# Patient Record
Sex: MALE | Race: WHITE | NOT HISPANIC OR LATINO | Employment: OTHER | URBAN - METROPOLITAN AREA
[De-identification: names, ages, dates, MRNs, and addresses within clinical notes are randomized per-mention and may not be internally consistent; named-entity substitution may affect disease eponyms.]

---

## 2023-08-20 ENCOUNTER — ANESTHESIA (OUTPATIENT)
Dept: SURGERY | Facility: CLINIC | Age: 63
End: 2023-08-20
Payer: COMMERCIAL

## 2023-08-20 ENCOUNTER — ANESTHESIA EVENT (OUTPATIENT)
Dept: SURGERY | Facility: CLINIC | Age: 63
End: 2023-08-20
Payer: COMMERCIAL

## 2023-08-20 ENCOUNTER — APPOINTMENT (OUTPATIENT)
Dept: GENERAL RADIOLOGY | Facility: CLINIC | Age: 63
End: 2023-08-20
Attending: STUDENT IN AN ORGANIZED HEALTH CARE EDUCATION/TRAINING PROGRAM
Payer: COMMERCIAL

## 2023-08-20 ENCOUNTER — APPOINTMENT (OUTPATIENT)
Dept: CT IMAGING | Facility: CLINIC | Age: 63
End: 2023-08-20
Attending: EMERGENCY MEDICINE
Payer: COMMERCIAL

## 2023-08-20 ENCOUNTER — HOSPITAL ENCOUNTER (INPATIENT)
Facility: CLINIC | Age: 63
LOS: 1 days | Discharge: HOME | End: 2023-08-21
Attending: EMERGENCY MEDICINE | Admitting: INTERNAL MEDICINE
Payer: COMMERCIAL

## 2023-08-20 DIAGNOSIS — N20.1 URETERAL STONE: ICD-10-CM

## 2023-08-20 DIAGNOSIS — N39.0 URINARY TRACT INFECTION WITHOUT HEMATURIA, SITE UNSPECIFIED: ICD-10-CM

## 2023-08-20 LAB
ALBUMIN SERPL BCG-MCNC: 4.9 G/DL (ref 3.5–5.2)
ALBUMIN UR-MCNC: NEGATIVE MG/DL
ALP SERPL-CCNC: 89 U/L (ref 40–129)
ALT SERPL W P-5'-P-CCNC: 24 U/L (ref 0–70)
ANION GAP SERPL CALCULATED.3IONS-SCNC: 17 MMOL/L (ref 7–15)
APPEARANCE UR: CLEAR
AST SERPL W P-5'-P-CCNC: 31 U/L (ref 0–45)
BACTERIA #/AREA URNS HPF: ABNORMAL /HPF
BASOPHILS # BLD AUTO: 0.1 10E3/UL (ref 0–0.2)
BASOPHILS NFR BLD AUTO: 0 %
BILIRUB SERPL-MCNC: 0.8 MG/DL
BILIRUB UR QL STRIP: NEGATIVE
BUN SERPL-MCNC: 20.7 MG/DL (ref 8–23)
CALCIUM SERPL-MCNC: 11.1 MG/DL (ref 8.8–10.2)
CHLORIDE SERPL-SCNC: 97 MMOL/L (ref 98–107)
COLOR UR AUTO: ABNORMAL
CREAT SERPL-MCNC: 1.2 MG/DL (ref 0.67–1.17)
DEPRECATED HCO3 PLAS-SCNC: 21 MMOL/L (ref 22–29)
EOSINOPHIL # BLD AUTO: 0 10E3/UL (ref 0–0.7)
EOSINOPHIL NFR BLD AUTO: 0 %
ERYTHROCYTE [DISTWIDTH] IN BLOOD BY AUTOMATED COUNT: 13.2 % (ref 10–15)
GFR SERPL CREATININE-BSD FRML MDRD: 68 ML/MIN/1.73M2
GLUCOSE SERPL-MCNC: 122 MG/DL (ref 70–99)
GLUCOSE UR STRIP-MCNC: NEGATIVE MG/DL
HCT VFR BLD AUTO: 47.8 % (ref 40–53)
HGB BLD-MCNC: 16.5 G/DL (ref 13.3–17.7)
HGB UR QL STRIP: ABNORMAL
HOLD SPECIMEN: NORMAL
IMM GRANULOCYTES # BLD: 0.1 10E3/UL
IMM GRANULOCYTES NFR BLD: 1 %
KETONES UR STRIP-MCNC: 10 MG/DL
LACTATE SERPL-SCNC: 1.5 MMOL/L (ref 0.7–2)
LEUKOCYTE ESTERASE UR QL STRIP: ABNORMAL
LIPASE SERPL-CCNC: 33 U/L (ref 13–60)
LYMPHOCYTES # BLD AUTO: 1.4 10E3/UL (ref 0.8–5.3)
LYMPHOCYTES NFR BLD AUTO: 8 %
MAGNESIUM SERPL-MCNC: 2 MG/DL (ref 1.7–2.3)
MCH RBC QN AUTO: 31.3 PG (ref 26.5–33)
MCHC RBC AUTO-ENTMCNC: 34.5 G/DL (ref 31.5–36.5)
MCV RBC AUTO: 91 FL (ref 78–100)
MONOCYTES # BLD AUTO: 1.5 10E3/UL (ref 0–1.3)
MONOCYTES NFR BLD AUTO: 9 %
MUCOUS THREADS #/AREA URNS LPF: PRESENT /LPF
NEUTROPHILS # BLD AUTO: 13.7 10E3/UL (ref 1.6–8.3)
NEUTROPHILS NFR BLD AUTO: 82 %
NITRATE UR QL: POSITIVE
NRBC # BLD AUTO: 0 10E3/UL
NRBC BLD AUTO-RTO: 0 /100
PH UR STRIP: 5 [PH] (ref 5–7)
PLATELET # BLD AUTO: 327 10E3/UL (ref 150–450)
POTASSIUM SERPL-SCNC: 3.6 MMOL/L (ref 3.4–5.3)
PROT SERPL-MCNC: 8.2 G/DL (ref 6.4–8.3)
RBC # BLD AUTO: 5.28 10E6/UL (ref 4.4–5.9)
RBC URINE: 3 /HPF
SODIUM SERPL-SCNC: 135 MMOL/L (ref 136–145)
SP GR UR STRIP: >1.035 (ref 1–1.03)
SQUAMOUS EPITHELIAL: <1 /HPF
UROBILINOGEN UR STRIP-MCNC: NORMAL MG/DL
WBC # BLD AUTO: 16.7 10E3/UL (ref 4–11)
WBC URINE: 15 /HPF

## 2023-08-20 PROCEDURE — 258N000001 HC RX 258: Performed by: STUDENT IN AN ORGANIZED HEALTH CARE EDUCATION/TRAINING PROGRAM

## 2023-08-20 PROCEDURE — 250N000011 HC RX IP 250 OP 636: Performed by: STUDENT IN AN ORGANIZED HEALTH CARE EDUCATION/TRAINING PROGRAM

## 2023-08-20 PROCEDURE — 250N000009 HC RX 250: Performed by: NURSE ANESTHETIST, CERTIFIED REGISTERED

## 2023-08-20 PROCEDURE — 96361 HYDRATE IV INFUSION ADD-ON: CPT

## 2023-08-20 PROCEDURE — 99285 EMERGENCY DEPT VISIT HI MDM: CPT | Mod: 25

## 2023-08-20 PROCEDURE — C2617 STENT, NON-COR, TEM W/O DEL: HCPCS | Performed by: STUDENT IN AN ORGANIZED HEALTH CARE EDUCATION/TRAINING PROGRAM

## 2023-08-20 PROCEDURE — 710N000009 HC RECOVERY PHASE 1, LEVEL 1, PER MIN: Performed by: STUDENT IN AN ORGANIZED HEALTH CARE EDUCATION/TRAINING PROGRAM

## 2023-08-20 PROCEDURE — 36415 COLL VENOUS BLD VENIPUNCTURE: CPT | Performed by: EMERGENCY MEDICINE

## 2023-08-20 PROCEDURE — 250N000013 HC RX MED GY IP 250 OP 250 PS 637: Performed by: STUDENT IN AN ORGANIZED HEALTH CARE EDUCATION/TRAINING PROGRAM

## 2023-08-20 PROCEDURE — 0T778DZ DILATION OF LEFT URETER WITH INTRALUMINAL DEVICE, VIA NATURAL OR ARTIFICIAL OPENING ENDOSCOPIC: ICD-10-PCS | Performed by: STUDENT IN AN ORGANIZED HEALTH CARE EDUCATION/TRAINING PROGRAM

## 2023-08-20 PROCEDURE — 258N000003 HC RX IP 258 OP 636: Performed by: STUDENT IN AN ORGANIZED HEALTH CARE EDUCATION/TRAINING PROGRAM

## 2023-08-20 PROCEDURE — 250N000011 HC RX IP 250 OP 636: Performed by: EMERGENCY MEDICINE

## 2023-08-20 PROCEDURE — 83690 ASSAY OF LIPASE: CPT | Performed by: EMERGENCY MEDICINE

## 2023-08-20 PROCEDURE — 80053 COMPREHEN METABOLIC PANEL: CPT | Performed by: EMERGENCY MEDICINE

## 2023-08-20 PROCEDURE — 74420 UROGRAPHY RTRGR +-KUB: CPT | Mod: 26 | Performed by: STUDENT IN AN ORGANIZED HEALTH CARE EDUCATION/TRAINING PROGRAM

## 2023-08-20 PROCEDURE — 360N000083 HC SURGERY LEVEL 3 W/ FLUORO, PER MIN: Performed by: STUDENT IN AN ORGANIZED HEALTH CARE EDUCATION/TRAINING PROGRAM

## 2023-08-20 PROCEDURE — 81001 URINALYSIS AUTO W/SCOPE: CPT | Performed by: EMERGENCY MEDICINE

## 2023-08-20 PROCEDURE — 82365 CALCULUS SPECTROSCOPY: CPT | Performed by: STUDENT IN AN ORGANIZED HEALTH CARE EDUCATION/TRAINING PROGRAM

## 2023-08-20 PROCEDURE — 74177 CT ABD & PELVIS W/CONTRAST: CPT

## 2023-08-20 PROCEDURE — 250N000009 HC RX 250: Performed by: STUDENT IN AN ORGANIZED HEALTH CARE EDUCATION/TRAINING PROGRAM

## 2023-08-20 PROCEDURE — 250N000011 HC RX IP 250 OP 636: Mod: JZ | Performed by: STUDENT IN AN ORGANIZED HEALTH CARE EDUCATION/TRAINING PROGRAM

## 2023-08-20 PROCEDURE — 96375 TX/PRO/DX INJ NEW DRUG ADDON: CPT

## 2023-08-20 PROCEDURE — 258N000003 HC RX IP 258 OP 636: Performed by: NURSE ANESTHETIST, CERTIFIED REGISTERED

## 2023-08-20 PROCEDURE — 272N000001 HC OR GENERAL SUPPLY STERILE: Performed by: STUDENT IN AN ORGANIZED HEALTH CARE EDUCATION/TRAINING PROGRAM

## 2023-08-20 PROCEDURE — 96376 TX/PRO/DX INJ SAME DRUG ADON: CPT

## 2023-08-20 PROCEDURE — 99221 1ST HOSP IP/OBS SF/LOW 40: CPT | Mod: 25 | Performed by: STUDENT IN AN ORGANIZED HEALTH CARE EDUCATION/TRAINING PROGRAM

## 2023-08-20 PROCEDURE — 258N000003 HC RX IP 258 OP 636: Performed by: PHYSICIAN ASSISTANT

## 2023-08-20 PROCEDURE — 999N000141 HC STATISTIC PRE-PROCEDURE NURSING ASSESSMENT: Performed by: STUDENT IN AN ORGANIZED HEALTH CARE EDUCATION/TRAINING PROGRAM

## 2023-08-20 PROCEDURE — 258N000003 HC RX IP 258 OP 636: Performed by: EMERGENCY MEDICINE

## 2023-08-20 PROCEDURE — 0TCB8ZZ EXTIRPATION OF MATTER FROM BLADDER, VIA NATURAL OR ARTIFICIAL OPENING ENDOSCOPIC: ICD-10-PCS | Performed by: STUDENT IN AN ORGANIZED HEALTH CARE EDUCATION/TRAINING PROGRAM

## 2023-08-20 PROCEDURE — 370N000017 HC ANESTHESIA TECHNICAL FEE, PER MIN: Performed by: STUDENT IN AN ORGANIZED HEALTH CARE EDUCATION/TRAINING PROGRAM

## 2023-08-20 PROCEDURE — 87149 DNA/RNA DIRECT PROBE: CPT | Performed by: EMERGENCY MEDICINE

## 2023-08-20 PROCEDURE — 999N000179 XR SURGERY CARM FLUORO LESS THAN 5 MIN W STILLS: Mod: TC

## 2023-08-20 PROCEDURE — 250N000011 HC RX IP 250 OP 636: Performed by: NURSE ANESTHETIST, CERTIFIED REGISTERED

## 2023-08-20 PROCEDURE — 250N000025 HC SEVOFLURANE, PER MIN: Performed by: STUDENT IN AN ORGANIZED HEALTH CARE EDUCATION/TRAINING PROGRAM

## 2023-08-20 PROCEDURE — 83735 ASSAY OF MAGNESIUM: CPT | Performed by: PHYSICIAN ASSISTANT

## 2023-08-20 PROCEDURE — 85025 COMPLETE CBC W/AUTO DIFF WBC: CPT | Performed by: EMERGENCY MEDICINE

## 2023-08-20 PROCEDURE — 96374 THER/PROPH/DIAG INJ IV PUSH: CPT | Mod: 59

## 2023-08-20 PROCEDURE — 52332 CYSTOSCOPY AND TREATMENT: CPT | Mod: LT | Performed by: STUDENT IN AN ORGANIZED HEALTH CARE EDUCATION/TRAINING PROGRAM

## 2023-08-20 PROCEDURE — 87077 CULTURE AEROBIC IDENTIFY: CPT | Performed by: EMERGENCY MEDICINE

## 2023-08-20 PROCEDURE — 87086 URINE CULTURE/COLONY COUNT: CPT | Performed by: EMERGENCY MEDICINE

## 2023-08-20 PROCEDURE — 250N000011 HC RX IP 250 OP 636: Mod: JZ | Performed by: EMERGENCY MEDICINE

## 2023-08-20 PROCEDURE — 99222 1ST HOSP IP/OBS MODERATE 55: CPT | Performed by: PHYSICIAN ASSISTANT

## 2023-08-20 PROCEDURE — 120N000001 HC R&B MED SURG/OB

## 2023-08-20 PROCEDURE — 258N000003 HC RX IP 258 OP 636: Performed by: ANESTHESIOLOGY

## 2023-08-20 PROCEDURE — 83605 ASSAY OF LACTIC ACID: CPT | Performed by: EMERGENCY MEDICINE

## 2023-08-20 PROCEDURE — C1758 CATHETER, URETERAL: HCPCS | Performed by: STUDENT IN AN ORGANIZED HEALTH CARE EDUCATION/TRAINING PROGRAM

## 2023-08-20 DEVICE — STENT URETERAL POLARIS ULTRA 6FRX26CM M0061921330: Type: IMPLANTABLE DEVICE | Site: URETER | Status: FUNCTIONAL

## 2023-08-20 RX ORDER — ATORVASTATIN CALCIUM 10 MG/1
10 TABLET, FILM COATED ORAL EVERY EVENING
Status: DISCONTINUED | OUTPATIENT
Start: 2023-08-20 | End: 2023-08-21 | Stop reason: HOSPADM

## 2023-08-20 RX ORDER — ONDANSETRON 2 MG/ML
4 INJECTION INTRAMUSCULAR; INTRAVENOUS EVERY 30 MIN PRN
Status: DISCONTINUED | OUTPATIENT
Start: 2023-08-20 | End: 2023-08-20 | Stop reason: HOSPADM

## 2023-08-20 RX ORDER — CEFTRIAXONE 2 G/1
2 INJECTION, POWDER, FOR SOLUTION INTRAMUSCULAR; INTRAVENOUS ONCE
Status: COMPLETED | OUTPATIENT
Start: 2023-08-20 | End: 2023-08-20

## 2023-08-20 RX ORDER — HYDROMORPHONE HCL IN WATER/PF 6 MG/30 ML
0.2 PATIENT CONTROLLED ANALGESIA SYRINGE INTRAVENOUS
Status: DISCONTINUED | OUTPATIENT
Start: 2023-08-20 | End: 2023-08-21 | Stop reason: HOSPADM

## 2023-08-20 RX ORDER — LORATADINE 10 MG/1
10 TABLET ORAL EVERY EVENING
COMMUNITY

## 2023-08-20 RX ORDER — NALOXONE HYDROCHLORIDE 0.4 MG/ML
0.4 INJECTION, SOLUTION INTRAMUSCULAR; INTRAVENOUS; SUBCUTANEOUS
Status: DISCONTINUED | OUTPATIENT
Start: 2023-08-20 | End: 2023-08-21 | Stop reason: HOSPADM

## 2023-08-20 RX ORDER — VIBEGRON 75 MG/1
1 TABLET, FILM COATED ORAL EVERY EVENING
COMMUNITY
Start: 2023-07-24

## 2023-08-20 RX ORDER — FENTANYL CITRATE 50 UG/ML
INJECTION, SOLUTION INTRAMUSCULAR; INTRAVENOUS PRN
Status: DISCONTINUED | OUTPATIENT
Start: 2023-08-20 | End: 2023-08-20

## 2023-08-20 RX ORDER — OXYCODONE HYDROCHLORIDE 5 MG/1
10 TABLET ORAL
Status: DISCONTINUED | OUTPATIENT
Start: 2023-08-20 | End: 2023-08-20 | Stop reason: HOSPADM

## 2023-08-20 RX ORDER — ONDANSETRON 4 MG/1
4 TABLET, ORALLY DISINTEGRATING ORAL EVERY 30 MIN PRN
Status: DISCONTINUED | OUTPATIENT
Start: 2023-08-20 | End: 2023-08-20 | Stop reason: HOSPADM

## 2023-08-20 RX ORDER — ONDANSETRON 2 MG/ML
INJECTION INTRAMUSCULAR; INTRAVENOUS PRN
Status: DISCONTINUED | OUTPATIENT
Start: 2023-08-20 | End: 2023-08-20

## 2023-08-20 RX ORDER — ONDANSETRON 2 MG/ML
4 INJECTION INTRAMUSCULAR; INTRAVENOUS ONCE
Status: COMPLETED | OUTPATIENT
Start: 2023-08-20 | End: 2023-08-20

## 2023-08-20 RX ORDER — HYDROMORPHONE HCL IN WATER/PF 6 MG/30 ML
0.4 PATIENT CONTROLLED ANALGESIA SYRINGE INTRAVENOUS EVERY 5 MIN PRN
Status: DISCONTINUED | OUTPATIENT
Start: 2023-08-20 | End: 2023-08-20 | Stop reason: HOSPADM

## 2023-08-20 RX ORDER — HYDROMORPHONE HYDROCHLORIDE 1 MG/ML
0.5 INJECTION, SOLUTION INTRAMUSCULAR; INTRAVENOUS; SUBCUTANEOUS ONCE
Status: COMPLETED | OUTPATIENT
Start: 2023-08-20 | End: 2023-08-20

## 2023-08-20 RX ORDER — CEFTRIAXONE 1 G/1
1 INJECTION, POWDER, FOR SOLUTION INTRAMUSCULAR; INTRAVENOUS EVERY 24 HOURS
Status: DISCONTINUED | OUTPATIENT
Start: 2023-08-21 | End: 2023-08-21

## 2023-08-20 RX ORDER — NALOXONE HYDROCHLORIDE 0.4 MG/ML
0.2 INJECTION, SOLUTION INTRAMUSCULAR; INTRAVENOUS; SUBCUTANEOUS
Status: DISCONTINUED | OUTPATIENT
Start: 2023-08-20 | End: 2023-08-21 | Stop reason: HOSPADM

## 2023-08-20 RX ORDER — CEFTRIAXONE 2 G/1
2 INJECTION, POWDER, FOR SOLUTION INTRAMUSCULAR; INTRAVENOUS
Status: COMPLETED | OUTPATIENT
Start: 2023-08-20 | End: 2023-08-20

## 2023-08-20 RX ORDER — LIDOCAINE 40 MG/G
CREAM TOPICAL
Status: DISCONTINUED | OUTPATIENT
Start: 2023-08-20 | End: 2023-08-21 | Stop reason: HOSPADM

## 2023-08-20 RX ORDER — OXYCODONE HYDROCHLORIDE 5 MG/1
5 TABLET ORAL
Status: DISCONTINUED | OUTPATIENT
Start: 2023-08-20 | End: 2023-08-20 | Stop reason: HOSPADM

## 2023-08-20 RX ORDER — KETOROLAC TROMETHAMINE 30 MG/ML
INJECTION, SOLUTION INTRAMUSCULAR; INTRAVENOUS PRN
Status: DISCONTINUED | OUTPATIENT
Start: 2023-08-20 | End: 2023-08-20

## 2023-08-20 RX ORDER — AMOXICILLIN 250 MG
1 CAPSULE ORAL 2 TIMES DAILY PRN
Status: DISCONTINUED | OUTPATIENT
Start: 2023-08-20 | End: 2023-08-21 | Stop reason: HOSPADM

## 2023-08-20 RX ORDER — ONDANSETRON 4 MG/1
4 TABLET, ORALLY DISINTEGRATING ORAL EVERY 6 HOURS PRN
Status: DISCONTINUED | OUTPATIENT
Start: 2023-08-20 | End: 2023-08-21 | Stop reason: HOSPADM

## 2023-08-20 RX ORDER — SODIUM CHLORIDE, SODIUM LACTATE, POTASSIUM CHLORIDE, CALCIUM CHLORIDE 600; 310; 30; 20 MG/100ML; MG/100ML; MG/100ML; MG/100ML
INJECTION, SOLUTION INTRAVENOUS CONTINUOUS
Status: DISCONTINUED | OUTPATIENT
Start: 2023-08-20 | End: 2023-08-20 | Stop reason: HOSPADM

## 2023-08-20 RX ORDER — HYDROMORPHONE HCL IN WATER/PF 6 MG/30 ML
0.2 PATIENT CONTROLLED ANALGESIA SYRINGE INTRAVENOUS EVERY 5 MIN PRN
Status: DISCONTINUED | OUTPATIENT
Start: 2023-08-20 | End: 2023-08-20 | Stop reason: HOSPADM

## 2023-08-20 RX ORDER — ALLOPURINOL 100 MG/1
100 TABLET ORAL EVERY EVENING
Status: DISCONTINUED | OUTPATIENT
Start: 2023-08-20 | End: 2023-08-21 | Stop reason: HOSPADM

## 2023-08-20 RX ORDER — IOPAMIDOL 755 MG/ML
500 INJECTION, SOLUTION INTRAVASCULAR ONCE
Status: COMPLETED | OUTPATIENT
Start: 2023-08-20 | End: 2023-08-20

## 2023-08-20 RX ORDER — FENTANYL CITRATE 50 UG/ML
50 INJECTION, SOLUTION INTRAMUSCULAR; INTRAVENOUS EVERY 5 MIN PRN
Status: DISCONTINUED | OUTPATIENT
Start: 2023-08-20 | End: 2023-08-20 | Stop reason: HOSPADM

## 2023-08-20 RX ORDER — ALLOPURINOL 100 MG/1
100 TABLET ORAL EVERY EVENING
COMMUNITY
Start: 2023-08-02

## 2023-08-20 RX ORDER — ATORVASTATIN CALCIUM 10 MG/1
10 TABLET, FILM COATED ORAL EVERY EVENING
COMMUNITY

## 2023-08-20 RX ORDER — AMOXICILLIN 250 MG
2 CAPSULE ORAL 2 TIMES DAILY PRN
Status: DISCONTINUED | OUTPATIENT
Start: 2023-08-20 | End: 2023-08-21 | Stop reason: HOSPADM

## 2023-08-20 RX ORDER — HYDROMORPHONE HCL IN WATER/PF 6 MG/30 ML
0.4 PATIENT CONTROLLED ANALGESIA SYRINGE INTRAVENOUS
Status: DISCONTINUED | OUTPATIENT
Start: 2023-08-20 | End: 2023-08-21 | Stop reason: HOSPADM

## 2023-08-20 RX ORDER — LIDOCAINE HYDROCHLORIDE 20 MG/ML
INJECTION, SOLUTION INFILTRATION; PERINEURAL PRN
Status: DISCONTINUED | OUTPATIENT
Start: 2023-08-20 | End: 2023-08-20

## 2023-08-20 RX ORDER — ONDANSETRON 2 MG/ML
4 INJECTION INTRAMUSCULAR; INTRAVENOUS EVERY 6 HOURS PRN
Status: DISCONTINUED | OUTPATIENT
Start: 2023-08-20 | End: 2023-08-21 | Stop reason: HOSPADM

## 2023-08-20 RX ORDER — ACETAMINOPHEN 325 MG/1
650 TABLET ORAL EVERY 6 HOURS PRN
Status: DISCONTINUED | OUTPATIENT
Start: 2023-08-20 | End: 2023-08-21 | Stop reason: HOSPADM

## 2023-08-20 RX ORDER — DEXAMETHASONE SODIUM PHOSPHATE 4 MG/ML
INJECTION, SOLUTION INTRA-ARTICULAR; INTRALESIONAL; INTRAMUSCULAR; INTRAVENOUS; SOFT TISSUE PRN
Status: DISCONTINUED | OUTPATIENT
Start: 2023-08-20 | End: 2023-08-20

## 2023-08-20 RX ORDER — FENTANYL CITRATE 50 UG/ML
25 INJECTION, SOLUTION INTRAMUSCULAR; INTRAVENOUS EVERY 5 MIN PRN
Status: DISCONTINUED | OUTPATIENT
Start: 2023-08-20 | End: 2023-08-20 | Stop reason: HOSPADM

## 2023-08-20 RX ORDER — SODIUM CHLORIDE 9 MG/ML
INJECTION, SOLUTION INTRAVENOUS CONTINUOUS
Status: DISCONTINUED | OUTPATIENT
Start: 2023-08-20 | End: 2023-08-21 | Stop reason: HOSPADM

## 2023-08-20 RX ORDER — LISINOPRIL 10 MG/1
10 TABLET ORAL EVERY EVENING
COMMUNITY

## 2023-08-20 RX ORDER — OXYCODONE HYDROCHLORIDE 5 MG/1
5 TABLET ORAL EVERY 4 HOURS PRN
Status: DISCONTINUED | OUTPATIENT
Start: 2023-08-20 | End: 2023-08-21 | Stop reason: HOSPADM

## 2023-08-20 RX ORDER — SODIUM CHLORIDE, SODIUM LACTATE, POTASSIUM CHLORIDE, CALCIUM CHLORIDE 600; 310; 30; 20 MG/100ML; MG/100ML; MG/100ML; MG/100ML
INJECTION, SOLUTION INTRAVENOUS CONTINUOUS PRN
Status: DISCONTINUED | OUTPATIENT
Start: 2023-08-20 | End: 2023-08-20

## 2023-08-20 RX ORDER — LIDOCAINE 40 MG/G
CREAM TOPICAL
Status: DISCONTINUED | OUTPATIENT
Start: 2023-08-20 | End: 2023-08-20 | Stop reason: HOSPADM

## 2023-08-20 RX ORDER — EPHEDRINE SULFATE 50 MG/ML
INJECTION, SOLUTION INTRAMUSCULAR; INTRAVENOUS; SUBCUTANEOUS PRN
Status: DISCONTINUED | OUTPATIENT
Start: 2023-08-20 | End: 2023-08-20

## 2023-08-20 RX ORDER — FAMOTIDINE 20 MG/1
20 TABLET, FILM COATED ORAL 2 TIMES DAILY
Status: DISCONTINUED | OUTPATIENT
Start: 2023-08-20 | End: 2023-08-21 | Stop reason: HOSPADM

## 2023-08-20 RX ORDER — PROPOFOL 10 MG/ML
INJECTION, EMULSION INTRAVENOUS PRN
Status: DISCONTINUED | OUTPATIENT
Start: 2023-08-20 | End: 2023-08-20

## 2023-08-20 RX ADMIN — MIDAZOLAM 1 MG: 1 INJECTION INTRAMUSCULAR; INTRAVENOUS at 11:00

## 2023-08-20 RX ADMIN — Medication 5 MG: at 11:09

## 2023-08-20 RX ADMIN — SODIUM CHLORIDE, POTASSIUM CHLORIDE, SODIUM LACTATE AND CALCIUM CHLORIDE: 600; 310; 30; 20 INJECTION, SOLUTION INTRAVENOUS at 10:19

## 2023-08-20 RX ADMIN — ONDANSETRON 4 MG: 2 INJECTION INTRAMUSCULAR; INTRAVENOUS at 06:42

## 2023-08-20 RX ADMIN — CEFTRIAXONE 2 G: 2 INJECTION, POWDER, FOR SOLUTION INTRAMUSCULAR; INTRAVENOUS at 08:36

## 2023-08-20 RX ADMIN — ONDANSETRON 4 MG: 2 INJECTION INTRAMUSCULAR; INTRAVENOUS at 11:12

## 2023-08-20 RX ADMIN — DEXAMETHASONE SODIUM PHOSPHATE 8 MG: 4 INJECTION, SOLUTION INTRA-ARTICULAR; INTRALESIONAL; INTRAMUSCULAR; INTRAVENOUS; SOFT TISSUE at 11:05

## 2023-08-20 RX ADMIN — KETOROLAC TROMETHAMINE 15 MG: 30 INJECTION, SOLUTION INTRAMUSCULAR at 11:22

## 2023-08-20 RX ADMIN — FENTANYL CITRATE 100 MCG: 50 INJECTION, SOLUTION INTRAMUSCULAR; INTRAVENOUS at 11:05

## 2023-08-20 RX ADMIN — PHENYLEPHRINE HYDROCHLORIDE 50 MCG: 10 INJECTION INTRAVENOUS at 11:16

## 2023-08-20 RX ADMIN — SODIUM CHLORIDE, POTASSIUM CHLORIDE, SODIUM LACTATE AND CALCIUM CHLORIDE: 600; 310; 30; 20 INJECTION, SOLUTION INTRAVENOUS at 10:52

## 2023-08-20 RX ADMIN — SODIUM CHLORIDE 1000 ML: 9 INJECTION, SOLUTION INTRAVENOUS at 06:42

## 2023-08-20 RX ADMIN — CEFTRIAXONE 2 G: 2 INJECTION, POWDER, FOR SOLUTION INTRAMUSCULAR; INTRAVENOUS at 11:02

## 2023-08-20 RX ADMIN — LIDOCAINE HYDROCHLORIDE 50 MG: 20 INJECTION, SOLUTION INFILTRATION; PERINEURAL at 11:05

## 2023-08-20 RX ADMIN — SODIUM CHLORIDE: 9 INJECTION, SOLUTION INTRAVENOUS at 09:18

## 2023-08-20 RX ADMIN — ALLOPURINOL 100 MG: 100 TABLET ORAL at 22:06

## 2023-08-20 RX ADMIN — ATORVASTATIN CALCIUM 10 MG: 10 TABLET, FILM COATED ORAL at 22:06

## 2023-08-20 RX ADMIN — PROPOFOL 200 MG: 10 INJECTION, EMULSION INTRAVENOUS at 11:05

## 2023-08-20 RX ADMIN — HYDROMORPHONE HYDROCHLORIDE 0.5 MG: 1 INJECTION, SOLUTION INTRAMUSCULAR; INTRAVENOUS; SUBCUTANEOUS at 07:52

## 2023-08-20 RX ADMIN — SODIUM CHLORIDE: 9 INJECTION, SOLUTION INTRAVENOUS at 22:03

## 2023-08-20 RX ADMIN — HYDROMORPHONE HYDROCHLORIDE 0.5 MG: 1 INJECTION, SOLUTION INTRAMUSCULAR; INTRAVENOUS; SUBCUTANEOUS at 06:41

## 2023-08-20 RX ADMIN — IOPAMIDOL 98 ML: 755 INJECTION, SOLUTION INTRAVENOUS at 06:58

## 2023-08-20 RX ADMIN — FAMOTIDINE 20 MG: 20 TABLET, FILM COATED ORAL at 22:06

## 2023-08-20 ASSESSMENT — ACTIVITIES OF DAILY LIVING (ADL)
ADLS_ACUITY_SCORE: 35

## 2023-08-20 ASSESSMENT — ENCOUNTER SYMPTOMS: DYSRHYTHMIAS: 0

## 2023-08-20 NOTE — ED PROVIDER NOTES
"  History     Chief Complaint:  Abdominal Pain     HPI   Kwan Saeed is a 62 year old male with no pertinent past medical history who presents with abdominal pain beginning 6.5 hours ago. Patient states he came up here from [place] with friends for a party two days ago and reports he \"drank too much\" on Friday night. He woke up the following morning with diarrhea and took imodium which helped him feel better. Last night the patient went to another party and states he drank two beers; at midnight he reports lower abdominal cramping that has been persistent since. Endorses nausea and reports he unsuccessfully attempted to force himself to vomit. Denies allergies. He states he takes Lisinopril, Atorvastatin, Allopurinol, Gemtessa, and an antihistamine. Reports he has had kidney stones in the past. Patient still has his appendix.     Independent Historian:   None - Patient Only    Review of External Notes:   none    Medications:    Per HPI    Past Medical History:    Kidney stones   Hypertension  Reflux  Hyperlipidemia    Physical Exam   Patient Vitals for the past 24 hrs:   BP Temp Temp src Pulse Resp SpO2 Height Weight   08/20/23 0834 -- -- -- -- -- 98 % -- --   08/20/23 0819 (!) 146/87 -- -- 74 -- 96 % -- --   08/20/23 0804 -- -- -- -- -- 98 % -- --   08/20/23 0803 -- -- -- -- -- 99 % -- --   08/20/23 0802 -- -- -- -- -- 98 % -- --   08/20/23 0801 -- -- -- -- -- 96 % -- --   08/20/23 0800 (!) 152/88 -- -- 83 -- 99 % -- --   08/20/23 0724 132/75 -- -- 75 -- -- -- --   08/20/23 0635 (!) 147/87 -- -- 74 -- -- -- --   08/20/23 0633 (!) 147/87 -- -- 74 -- -- -- --   08/20/23 0616 (!) 154/89 -- -- -- -- -- -- --   08/20/23 0615 -- 97  F (36.1  C) Temporal 79 18 99 % 1.854 m (6' 1\") 88.5 kg (195 lb)        Physical Exam  Nursing note and vitals reviewed.  HENT:   Mouth/Throat: Moist mucous membranes.   Eyes: EOMI, nonicteric sclera  Cardiovascular: Normal rate, regular rhythm, no murmurs, rubs, or gallops  Pulmonary/Chest: " Effort normal and breath sounds normal. No respiratory distress. No wheezes. No rales.   Abdominal: Soft.  Tenderness to palpation right lower quadrant, nondistended, no guarding or rigidity.   Musculoskeletal: Normal range of motion.   Neurological: Alert. Moves all extremities spontaneously.   Skin: Skin is warm and dry. No rash noted.         Emergency Department Course       Imaging:  CT Abdomen Pelvis w Contrast   Final Result   IMPRESSION:    1.  Horseshoe kidney.   2.  Obstructing left 6 mm ureterovesical junction calculus resulting in moderate left moiety collecting system dilatation, perinephric edema and delayed nephrogram.   3.  Additional multiple bilateral nonobstructing renal calculi.   4.  Left lower lobe 2 mm nodule. Follow-up per Fleischner criteria.      REFERENCE:   Guidelines for Management of Incidental Pulmonary Nodules Detected on CT Images: From the Fleischner Society 2017.    Guidelines apply to incidental nodules in patients who are 35 years or older.   Guidelines do not apply to lung cancer screening, patients with immunosuppression, or patients with known primary cancer.      SINGLE NODULE   Nodule size <6 mm   Low-risk patients: No follow-up needed.   High-risk patients: Optional follow-up at 12 months.                  Report per radiology    Laboratory:  Labs Ordered and Resulted from Time of ED Arrival to Time of ED Departure   ROUTINE UA WITH MICROSCOPIC REFLEX TO CULTURE - Abnormal       Result Value    Color Urine Light Yellow      Appearance Urine Clear      Glucose Urine Negative      Bilirubin Urine Negative      Ketones Urine 10 (*)     Specific Gravity Urine >1.035 (*)     Blood Urine Trace (*)     pH Urine 5.0      Protein Albumin Urine Negative      Urobilinogen Urine Normal      Nitrite Urine Positive (*)     Leukocyte Esterase Urine Moderate (*)     Bacteria Urine Few (*)     Mucus Urine Present (*)     RBC Urine 3 (*)     WBC Urine 15 (*)     Squamous Epithelials Urine <1      COMPREHENSIVE METABOLIC PANEL - Abnormal    Sodium 135 (*)     Potassium 3.6      Chloride 97 (*)     Carbon Dioxide (CO2) 21 (*)     Anion Gap 17 (*)     Urea Nitrogen 20.7      Creatinine 1.20 (*)     Calcium 11.1 (*)     Glucose 122 (*)     Alkaline Phosphatase 89      AST 31      ALT 24      Protein Total 8.2      Albumin 4.9      Bilirubin Total 0.8      GFR Estimate 68     CBC WITH PLATELETS AND DIFFERENTIAL - Abnormal    WBC Count 16.7 (*)     RBC Count 5.28      Hemoglobin 16.5      Hematocrit 47.8      MCV 91      MCH 31.3      MCHC 34.5      RDW 13.2      Platelet Count 327      % Neutrophils 82      % Lymphocytes 8      % Monocytes 9      % Eosinophils 0      % Basophils 0      % Immature Granulocytes 1      NRBCs per 100 WBC 0      Absolute Neutrophils 13.7 (*)     Absolute Lymphocytes 1.4      Absolute Monocytes 1.5 (*)     Absolute Eosinophils 0.0      Absolute Basophils 0.1      Absolute Immature Granulocytes 0.1      Absolute NRBCs 0.0     LIPASE - Normal    Lipase 33     LACTIC ACID WHOLE BLOOD - Normal    Lactic Acid 1.5     URINE CULTURE   BLOOD CULTURE   BLOOD CULTURE        Emergency Department Course & Assessments:       Interventions:  Medications   cefTRIAXone (ROCEPHIN) 2 g vial to attach to  ml bag for ADULTS or NS 50 ml bag for PEDS (has no administration in time range)   HYDROmorphone (PF) (DILAUDID) injection 0.5 mg (0.5 mg Intravenous $Given 8/20/23 0641)   0.9% sodium chloride BOLUS (1,000 mLs Intravenous $New Bag 8/20/23 0642)   ondansetron (ZOFRAN) injection 4 mg (4 mg Intravenous $Given 8/20/23 0642)   sodium chloride (PF) 0.9% PF flush 100 mL (64 mLs Intravenous $Given 8/20/23 0657)   iopamidol (ISOVUE-370) solution 500 mL (98 mLs Intravenous $Given 8/20/23 0658)   HYDROmorphone (PF) (DILAUDID) injection 0.5 mg (0.5 mg Intravenous $Given 8/20/23 0752)        Assessments:  0631 I entered the patient's room and obtained history.  0800 I rechecked the patient and explained  findings.    Independent Interpretation (X-rays, CTs, rhythm strip):  I independently reviewed the abdominal CT.  Horseshoe kidney visualized with multiple intrarenal stones.  Evidence of obstructing stone in the distal left ureter.      Consultations/Discussion of Management or Tests:  0815    I consulted with Dr. Vazquez, urology, regarding the patient's history and presentation here in the emergency department.   0830 I consulted with Gricelda Brown PA-C, for Dr. Baumann, hospitalist, regarding the patient's history and presentation here in the emergency department who accepted the patient for admission.       Social Determinants of Health affecting care:   None    Disposition:  The patient was admitted to the hospital under the care of Dr. Baumann.     Impression & Plan      Medical Decision Making:  Patient presents with chief complaint abdominal pain.  Notably, on exam, his pain is worse in the right lower quadrant.  Broad differential considered including appendicitis, bowel obstruction, kidney stone, cholecystitis, among many other etiologies.  Patient with extensive history of kidney stones, though states this pain feels different today.  Work-up notable for leukocytosis as well as nitrite positive urine suggestive of infection.  This is in conjunction with an obstructing stone in the distal left ureter.  Discussed with on-call urology, Dr. Vazquez, who plans on operative intervention later today for likely stent placement.  Antibiotics initiated for treatment of potential septic stone.  Fortunately, no hypotension or tachycardia at this time.  The above was discussed with patient at bedside and he voices understanding, agreement, and all questions answered.  We will plan for admission bed with OR later today.  Case discussed with hospitalist service, DIAZ Brown, who accepts patient for admission.    Diagnosis:    ICD-10-CM    1. Ureteral stone  N20.1       2. Urinary tract infection without hematuria, site  unspecified  N39.0            Discharge Medications:  New Prescriptions    No medications on file     Scribe Disclosure:  I, Terencebrian Hired, am serving as a scribe at 6:26 AM on 8/20/2023 to document services personally performed by Margarito Elliott MD based on my observations and the provider's statements to me.          Margarito Elliott MD  08/20/23 7555

## 2023-08-20 NOTE — PHARMACY-ADMISSION MEDICATION HISTORY
Pharmacist Admission Medication History    Admission medication history is complete. The information provided in this note is only as accurate as the sources available at the time of the update.    Medication reconciliation/reorder completed by provider prior to medication history? No    Information Source(s): Patient and CareEverywhere/SureScripts via in-person    Pertinent Information: none    Changes made to PTA medication list:  Added: All PTA meds were added  Deleted: None  Changed: None    Medication Affordability:  Not including over the counter (OTC) medications, was there a time in the past 3 months when you did not take your medications as prescribed because of cost?: No    Allergies reviewed with patient and updates made in EHR: yes    Medication History Completed By:   Eshter Sanhcez PharmD, [facility]   Emergency Medicine Pharmacist  [phone] or Marco  August 20, 2023      Prior to Admission medications    Medication Sig Last Dose Taking? Auth Provider Long Term End Date   allopurinol (ZYLOPRIM) 100 MG tablet Take 100 mg by mouth every evening 8/19/2023 at pm Yes Unknown, Entered By History     atorvastatin (LIPITOR) 10 MG tablet Take 10 mg by mouth every evening 8/19/2023 at pm Yes Unknown, Entered By History Yes    GEMTESA 75 MG TABS tablet Take 1 tablet by mouth every evening 8/19/2023 at pm Yes Unknown, Entered By History     lisinopril (ZESTRIL) 10 MG tablet Take 10 mg by mouth every evening 8/19/2023 at pm Yes Unknown, Entered By History Yes    loratadine (CLARITIN) 10 MG tablet Take 10 mg by mouth every evening 8/19/2023 at pm Yes Unknown, Entered By History

## 2023-08-20 NOTE — ANESTHESIA CARE TRANSFER NOTE
Patient: Kwan BRITO Gross    Procedure: Procedure(s):  CYSTOSCOPY, BLADDER STONE EXTRACTION, LEFT RETROGRADE PYELOGRAM AND URETERAL STENT INSERTION       Diagnosis: Ureteral stone [N20.1]  Urinary tract infection without hematuria, site unspecified [N39.0]  Diagnosis Additional Information: No value filed.    Anesthesia Type:   General     Note:    Oropharynx: spontaneously breathing  Level of Consciousness: awake  Oxygen Supplementation: face mask    Independent Airway: airway patency satisfactory and stable  Dentition: dentition unchanged  Vital Signs Stable: post-procedure vital signs reviewed and stable  Report to RN Given: handoff report given  Patient transferred to: PACU  Comments: Awake, alert, oxygen per face mask.        Vitals:  Vitals Value Taken Time   BP     Temp 96.7  F (35.9  C) 08/20/23 1137   Pulse 78 08/20/23 1140   Resp 21 08/20/23 1140   SpO2 99 % 08/20/23 1140   Vitals shown include unvalidated device data.    Electronically Signed By: RUPALI Menchaca CRNA  August 20, 2023  11:42 AM

## 2023-08-20 NOTE — CONSULTS
Urology Consult    Name:  Kwan Saeed  MRN:  [id number]  Age/: 62 year old, 1960    CC: Bilateral kidney stones, horseshoe kidney, left distal ureteral stones with acute UTI    HPI: Kwan Saeed is a(n) 62 year old male who is a recurrent stone passer for the last several years and has had multiple procedures in the past including ureteroscopy's, percutaneous nephrolithotomy's and shockwave lithotripsy.  He has a primary urologist in [place] and follows up with him on a regular basis.  His last stones were pure uric acid and therefore he was put on prophylaxis with allopurinol but denies taking any potassium citrate.  His last procedure was a shockwave lithotripsy a couple of years ago with significant residual burden as per the patient  Today presented with new onset pain which was significant and severe.  And was seen in the ER where initial work-up revealed elevated WBC counts with a urine picture suggestive of urinary tract infection and an obstructing left distal ureteral stone and multiple kidney stones with a horseshoe kidney    Past Medical History:  History reviewed. No pertinent past medical history.    Past Surgical History:  History reviewed. No pertinent surgical history.    Allergies:   No Known Allergies    Medications:  No current facility-administered medications on file prior to encounter.  allopurinol (ZYLOPRIM) 100 MG tablet, Take 100 mg by mouth every evening  atorvastatin (LIPITOR) 10 MG tablet, Take 10 mg by mouth every evening  GEMTESA 75 MG TABS tablet, Take 1 tablet by mouth every evening  lisinopril (ZESTRIL) 10 MG tablet, Take 10 mg by mouth every evening  loratadine (CLARITIN) 10 MG tablet, Take 10 mg by mouth every evening        Social History:  Social History     Socioeconomic History    Marital status:      Spouse name: Not on file    Number of children: Not on file    Years of education: Not on file    Highest education level: Not on file   Occupational History     "Not on file   Tobacco Use    Smoking status: Never    Smokeless tobacco: Never   Substance and Sexual Activity    Alcohol use: Not on file    Drug use: Not on file    Sexual activity: Not on file   Other Topics Concern    Not on file   Social History Narrative    Not on file     Social Determinants of Health     Financial Resource Strain: Not on file   Food Insecurity: Not on file   Transportation Needs: Not on file   Physical Activity: Not on file   Stress: Not on file   Social Connections: Not on file   Intimate Partner Violence: Not on file   Housing Stability: Not on file       Family History:  History reviewed. No pertinent family history.    ROS:  The remainder of the complete ROS was negative unless noted in the HPI.    Exam:  BP (!) 154/85   Pulse 82   Temp (!) 96.6  F (35.9  C) (Temporal)   Resp 16   Ht 1.854 m (6' 1\")   Wt 88.5 kg (195 lb)   SpO2 100%   BMI 25.73 kg/m    General: Alert, interactive, & in NAD  Resp: CTAB, no crackles or wheezes  Cardiac: Regular rate; extremities warm;   Abdomen: Soft, nontender nondistended. .  : Normal   Extremities: No LE edema or obvious joint abnormalities  Skin: Warm and dry, no jaundice or rash    Labs:  Results for orders placed or performed during the hospital encounter of 08/20/23 (from the past 24 hour(s))   Extra Tube ([place] Draw)    Narrative    The following orders were created for panel order Extra Tube ([place] Draw).  Procedure                               Abnormality         Status                     ---------                               -----------         ------                     Extra Red Top Tube[[id number]]                               Final result               Extra Green Top (Lithium...[[id number]]                      Final result               Extra Purple Top Tube[[id number]]                            Final result               Extra Green Top (Lithium...[[id number]]                      Final result                 Please view " results for these tests on the individual orders.   Extra Red Top Tube   Result Value Ref Range    Hold Specimen JIC    Extra Green Top (Lithium Heparin) Tube   Result Value Ref Range    Hold Specimen JIC    Extra Purple Top Tube   Result Value Ref Range    Hold Specimen JIC    Extra Green Top (Lithium Heparin) ON ICE   Result Value Ref Range    Hold Specimen JIC    CBC + differential    Narrative    The following orders were created for panel order CBC + differential.  Procedure                               Abnormality         Status                     ---------                               -----------         ------                     CBC with platelets and d...[[id number]]  Abnormal            Final result                 Please view results for these tests on the individual orders.   Comprehensive metabolic panel   Result Value Ref Range    Sodium 135 (L) 136 - 145 mmol/L    Potassium 3.6 3.4 - 5.3 mmol/L    Chloride 97 (L) 98 - 107 mmol/L    Carbon Dioxide (CO2) 21 (L) 22 - 29 mmol/L    Anion Gap 17 (H) 7 - 15 mmol/L    Urea Nitrogen 20.7 8.0 - 23.0 mg/dL    Creatinine 1.20 (H) 0.67 - 1.17 mg/dL    Calcium 11.1 (H) 8.8 - 10.2 mg/dL    Glucose 122 (H) 70 - 99 mg/dL    Alkaline Phosphatase 89 40 - 129 U/L    AST 31 0 - 45 U/L    ALT 24 0 - 70 U/L    Protein Total 8.2 6.4 - 8.3 g/dL    Albumin 4.9 3.5 - 5.2 g/dL    Bilirubin Total 0.8 <=1.2 mg/dL    GFR Estimate 68 >60 mL/min/1.73m2   Lipase   Result Value Ref Range    Lipase 33 13 - 60 U/L   CBC with platelets and differential   Result Value Ref Range    WBC Count 16.7 (H) 4.0 - 11.0 10e3/uL    RBC Count 5.28 4.40 - 5.90 10e6/uL    Hemoglobin 16.5 13.3 - 17.7 g/dL    Hematocrit 47.8 40.0 - 53.0 %    MCV 91 78 - 100 fL    MCH 31.3 26.5 - 33.0 pg    MCHC 34.5 31.5 - 36.5 g/dL    RDW 13.2 10.0 - 15.0 %    Platelet Count 327 150 - 450 10e3/uL    % Neutrophils 82 %    % Lymphocytes 8 %    % Monocytes 9 %    % Eosinophils 0 %    % Basophils 0 %    % Immature  Granulocytes 1 %    NRBCs per 100 WBC 0 <1 /100    Absolute Neutrophils 13.7 (H) 1.6 - 8.3 10e3/uL    Absolute Lymphocytes 1.4 0.8 - 5.3 10e3/uL    Absolute Monocytes 1.5 (H) 0.0 - 1.3 10e3/uL    Absolute Eosinophils 0.0 0.0 - 0.7 10e3/uL    Absolute Basophils 0.1 0.0 - 0.2 10e3/uL    Absolute Immature Granulocytes 0.1 <=0.4 10e3/uL    Absolute NRBCs 0.0 10e3/uL   Magnesium Lab   Result Value Ref Range    Magnesium 2.0 1.7 - 2.3 mg/dL   CT Abdomen Pelvis w Contrast    Narrative    EXAM: CT ABDOMEN AND PELVIS WITH CONTRAST  LOCATION: [facility]  DATE: 08/20/2023    INDICATION: Right lower quadrant abdomen pain.  COMPARISON: None.  TECHNIQUE: CT scan of the abdomen and pelvis was performed following injection of IV contrast. Multiplanar reformats were obtained. Dose reduction techniques were used.  CONTRAST: 98 mL Isovue 370.    FINDINGS:   LOWER CHEST: Left lower lobe 2 mm nodule (2/5).    HEPATOBILIARY: A few scattered subcentimeter hypodensities are too small to characterize, likely benign. Gallbladder is unremarkable.    PANCREAS: Normal.    SPLEEN: Normal.    ADRENAL GLANDS: Normal.    KIDNEYS/BLADDER: Horseshoe kidney. Left 6 mm ureterovesical junction calculus resulting in moderate upstream collecting system dilatation of the left moiety. Additional bilateral nonobstructing calculi (at least three on the right and eight on the left   moiety). Mild left moiety perinephric edema and delayed nephrogram. No solid renal mass.    BOWEL: Unremarkable.    LYMPH NODES: Several scattered left retroperitoneal lymph nodes measuring up to 1.0 cm, likely reactive.    VASCULATURE: Unremarkable.    PELVIC ORGANS: Enlarged prostate with brachytherapy seeds.    MUSCULOSKELETAL: Unremarkable.      Impression    IMPRESSION:   1.  Horseshoe kidney.  2.  Obstructing left 6 mm ureterovesical junction calculus resulting in moderate left moiety collecting system dilatation, perinephric edema and delayed  nephrogram.  3.  Additional multiple bilateral nonobstructing renal calculi.  4.  Left lower lobe 2 mm nodule. Follow-up per Fleischner criteria.    REFERENCE:  Guidelines for Management of Incidental Pulmonary Nodules Detected on CT Images: From the Fleischner Society 2017.   Guidelines apply to incidental nodules in patients who are 35 years or older.  Guidelines do not apply to lung cancer screening, patients with immunosuppression, or patients with known primary cancer.    SINGLE NODULE  Nodule size <6 mm  Low-risk patients: No follow-up needed.  High-risk patients: Optional follow-up at 12 months.         UA with Microscopic reflex to Culture    Specimen: Urine, Midstream   Result Value Ref Range    Color Urine Light Yellow Colorless, Straw, Light Yellow, Yellow    Appearance Urine Clear Clear    Glucose Urine Negative Negative mg/dL    Bilirubin Urine Negative Negative    Ketones Urine 10 (A) Negative mg/dL    Specific Gravity Urine >1.035 (H) 1.003 - 1.035    Blood Urine Trace (A) Negative    pH Urine 5.0 5.0 - 7.0    Protein Albumin Urine Negative Negative mg/dL    Urobilinogen Urine Normal Normal, 2.0 mg/dL    Nitrite Urine Positive (A) Negative    Leukocyte Esterase Urine Moderate (A) Negative    Bacteria Urine Few (A) None Seen /HPF    Mucus Urine Present (A) None Seen /LPF    RBC Urine 3 (H) <=2 /HPF    WBC Urine 15 (H) <=5 /HPF    Squamous Epithelials Urine <1 <=1 /HPF    Narrative    Urine Culture ordered based on laboratory criteria   Lactic acid whole blood   Result Value Ref Range    Lactic Acid 1.5 0.7 - 2.0 mmol/L   XR Surgery MICHAEL L/T 5 Min Fluoro w Stills    Narrative    This exam was marked as non-reportable because it will not be read by a   radiologist or a [facility] non-radiologist provider.               Assessment and Plan: Kwan Saeed is a(n) 62 year old male with history of multiple kidney stone episodes currently presenting with obstructing left distal ureteral stone bilateral kidney  stones with features of acute urinary tract infection and elevated WBC counts.  We discussed the options of management and agreed to proceed ahead with stent placement in view of the acute obstruction, pain and the features of urinary tract infection with WBC count elevation.  I discussed with him that he may need a follow-up procedure once he travels back to [place] with his primary urologist.  We discussed about the options of managing the kidney stones which would include either ureteroscopy or percutaneous nephrolithotomy.  I discussed with him that shockwave lithotripsy may not be a great option considering the number of stones that he has.  He tells me that his stone analysis had revealed uric acid stones the last time and he was hopeful for possible medical management.  I discussed with him that we will have to look at the current stone analysis and may be consider additional therapy with potassiums citrate on top of what he is already getting with allopurinol.  We agreed and proceeded ahead to schedule him for the urine cystoscopy and stent placement.  We discussed about postoperative issues including but not limited to dysuria frequency urgency and blood in the urine related to the stent and the possibility of urinary tract infection.  An informed consent was signed      Kenneth Vazquez MD  [facility]

## 2023-08-20 NOTE — H&P
[facility]  Internal Medicine  History and Physical      Patient Name: Kwan Saeed MRN# [id number]   Age: 62 year old YOB: 1960     Date of Admission:8/20/2023    Primary care provider: No Ref-Primary, Physician  Date of Service: 8/20/2023         Assessment and Plan:   Kwan Saeed is a 62 year old male with a history of GERD, Seasonal Allergies, HTN, HLD, BPH s/p Urolift procedure, Nephrolithiasis who presents to the ED today with abdominal pain.    Obstructing left 6 mm ureterovesical junction calculus   Mild NIGEL - pt presents with abdominal pain, nausea, emesis, recent diarrhea.  Pt is hemodynamically stable, afebrile.  WBC 16.7.  Creatinine 1.2 with baseline 0.9-1.1.  Negative lactic acid.  UA abnormal c/w infection.  CT abd/pelvis revealed an obstructing left 6 mm ureterovesical junction calculus, perinephric edema with additional multiple bilateral nonobstructing renal calculi and likely reactive scattered left retroperitoneal lymph nodes.       - continue IV Ceftriaxone  - follow up urine and blood cultures  - strain urine  - IVF hydration, analgesics and antiemetics prn  - Urology consult    BPH - s/p urolift procedure 4-5 years ago.  On Gemtesa pta; hold.    HTN - hold Lisinopril due to mild NIGEL    HLD - continue Atorvastatin    GERD - uses OTC meds prn.  Start po Famotodine    Pulmonary Nodule - Left lower lobe 2 mm nodule incidentally noted on CT.  Follow up monitoring with PCP    Gout - continue Allopurinol      CODE: full  Diet/IVF: npo, NS  GI ppx:  Famotidine  DVT ppx: SCD    Gricelda Brown MS PA-C  Physician Assistant   Hospitalist Service         Chief Complaint:   Abdominal Pain         HPI:   62 year old male with a history of GERD, Seasonal Allergies, HTN, HLD, BPH s/p Urolift procedure, Nephrolithiasis who presents to the ED today with abdominal pain.    Patient reports he developed a central low abdominal pain around midnight.  Pain has been constant without  radiation.  He had an episode of nausea and attempted to make himself vomit.  He denies dysuria,  hematuria, fever.  He reports a hx of kidney stones in the past which have required intervention, most recently 1.5 years ago.  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  He is suppose to fly home to [place] today.  He denies any hx of etoh abuse.  He reports he drank more beers that normal for him on Friday night and had a few loose stools yesterday which resolved after Imodium.  He had a a few beers last night as well.          Past Medical History:    Seasonal allergies 1980    Hyperlipidemia 2005    Kidney stones 10/2005    Heartburn 2011    S/P colonoscopy 8/2012    Seborrheic dermatitis 2013    [facility]    Benign hypertension        Past Surgical History:    OTHER 10/2005   Lithotripsy    OTHER SURGICAL HISTORY 2000, 2013   Cyst removed from back/head-benign        Social History:     Social History     Socioeconomic History    Marital status: Not on file     Spouse name: Not on file    Number of children: Not on file    Years of education: Not on file    Highest education level: Not on file   Occupational History    Not on file   Tobacco Use    Smoking status: Not on file    Smokeless tobacco: Not on file   Substance and Sexual Activity    Alcohol use: Not on file    Drug use: Not on file    Sexual activity: Not on file   Other Topics Concern    Not on file   Social History Narrative    Not on file     Social Determinants of Health     Financial Resource Strain: Not on file   Food Insecurity: Not on file   Transportation Needs: Not on file   Physical Activity: Not on file   Stress: Not on file   Social Connections: Not on file   Intimate Partner Violence: Not on file   Housing Stability: Not on file          Family History:    Cancer Mother 70   Basal cells removed     "Cancer Brother 40   Thyroid cancer        Allergies:    No Known Allergies       Medications:     Prior to Admission medications    Not on File          Review of Systems:   A complete ROS was performed and is negative other than what is stated in the HPI.       Physical Exam:   Blood pressure (!) 146/87, pulse 74, temperature 97  F (36.1  C), temperature source Temporal, resp. rate 18, height 1.854 m (6' 1\"), weight 88.5 kg (195 lb), SpO2 98 %.  General: Alert, interactive, NAD, sitting up in bed, pleasant and cooperative  HEENT: AT/NC  Chest/Resp: clear to auscultation bilaterally, no crackles or wheezes  Heart/CV: regular rate and rhythm, no murmur  Abdomen/GI: Soft, mild suprapubic tenderness, nondistended. +BS.  No rebound or guarding.  Extremities/MSK: No LE edema  Skin: Warm and dry  Neuro: Alert & oriented x 3, no focal deficits, moves all extremities equally         Labs:   ROUTINE ICU LABS (Last four results)  CMP  Recent Labs   Lab 08/20/23  0636   *   POTASSIUM 3.6   CHLORIDE 97*   CO2 21*   ANIONGAP 17*   *   BUN 20.7   CR 1.20*   GFRESTIMATED 68   MARÍA 11.1*   PROTTOTAL 8.2   ALBUMIN 4.9   BILITOTAL 0.8   ALKPHOS 89   AST 31   ALT 24     CBC  Recent Labs   Lab 08/20/23  0636   WBC 16.7*   RBC 5.28   HGB 16.5   HCT 47.8   MCV 91   MCH 31.3   MCHC 34.5   RDW 13.2        INRNo lab results found in last 7 days.  Arterial Blood GasNo lab results found in last 7 days.       Imaging/Procedures:     Results for orders placed or performed during the hospital encounter of 08/20/23   CT Abdomen Pelvis w Contrast    Narrative    EXAM: CT ABDOMEN AND PELVIS WITH CONTRAST  LOCATION: [facility]  DATE: 08/20/2023    INDICATION: Right lower quadrant abdomen pain.  COMPARISON: None.  TECHNIQUE: CT scan of the abdomen and pelvis was performed following injection of IV contrast. Multiplanar reformats were obtained. Dose reduction techniques were used.  CONTRAST: 98 mL Isovue " 370.    FINDINGS:   LOWER CHEST: Left lower lobe 2 mm nodule (2/5).    HEPATOBILIARY: A few scattered subcentimeter hypodensities are too small to characterize, likely benign. Gallbladder is unremarkable.    PANCREAS: Normal.    SPLEEN: Normal.    ADRENAL GLANDS: Normal.    KIDNEYS/BLADDER: Horseshoe kidney. Left 6 mm ureterovesical junction calculus resulting in moderate upstream collecting system dilatation of the left moiety. Additional bilateral nonobstructing calculi (at least three on the right and eight on the left   moiety). Mild left moiety perinephric edema and delayed nephrogram. No solid renal mass.    BOWEL: Unremarkable.    LYMPH NODES: Several scattered left retroperitoneal lymph nodes measuring up to 1.0 cm, likely reactive.    VASCULATURE: Unremarkable.    PELVIC ORGANS: Enlarged prostate with brachytherapy seeds.    MUSCULOSKELETAL: Unremarkable.      Impression    IMPRESSION:   1.  Horseshoe kidney.  2.  Obstructing left 6 mm ureterovesical junction calculus resulting in moderate left moiety collecting system dilatation, perinephric edema and delayed nephrogram.  3.  Additional multiple bilateral nonobstructing renal calculi.  4.  Left lower lobe 2 mm nodule. Follow-up per Fleischner criteria.    REFERENCE:  Guidelines for Management of Incidental Pulmonary Nodules Detected on CT Images: From the Fleischner Society 2017.   Guidelines apply to incidental nodules in patients who are 35 years or older.  Guidelines do not apply to lung cancer screening, patients with immunosuppression, or patients with known primary cancer.    SINGLE NODULE  Nodule size <6 mm  Low-risk patients: No follow-up needed.  High-risk patients: Optional follow-up at 12 months.

## 2023-08-20 NOTE — ANESTHESIA PREPROCEDURE EVALUATION
Anesthesia Pre-Procedure Evaluation    Patient: Kwan Saeed   MRN: [id number] : 1960        Procedure : Procedure(s):  CYSTOSCOPY, WITH RETROGRADE PYELOGRAM AND URETERAL STENT INSERTION          No past medical history on file.   No past surgical history on file.   No Known Allergies   Social History     Tobacco Use     Smoking status: Not on file     Smokeless tobacco: Not on file   Substance Use Topics     Alcohol use: Not on file      Wt Readings from Last 1 Encounters:   23 88.5 kg (195 lb)        Anesthesia Evaluation            ROS/MED HX  ENT/Pulmonary:    (-) sleep apnea   Neurologic:  - neg neurologic ROS     Cardiovascular:     (+) Dyslipidemia hypertension- -   -  - -                                   (-) CAD, CHF, arrhythmias and pulmonary hypertension   METS/Exercise Tolerance:     Hematologic:  - neg hematologic  ROS     Musculoskeletal:  - neg musculoskeletal ROS     GI/Hepatic:     (+) GERD (Asymptomatic since assisted 4 years ago.),                   Renal/Genitourinary:     (+) renal disease,  Pt does not require dialysis,    Nephrolithiasis , BPH,      Endo:  - neg endo ROS     Psychiatric/Substance Use:  - neg psychiatric ROS     Infectious Disease:       Malignancy:       Other:            Physical Exam    Airway        Mallampati: II   TM distance: > 3 FB   Neck ROM: full   Mouth opening: > 3 cm    Respiratory Devices and Support         Dental           Cardiovascular   cardiovascular exam normal          Pulmonary   pulmonary exam normal            Other findings: Lab Test        23                       0636          WBC          16.7*         HGB          16.5          MCV          91            PLT          327            Lab Test        23                       0636          NA           135*          POTASSIUM    3.6           CHLORIDE     97*           CO2          21*           BUN          20.7          CR           1.20*         ANIONGAP     17*            MARÍA          11.1*         GLC          122*          OUTSIDE LABS:  CBC:   Lab Results   Component Value Date    WBC 16.7 (H) 08/20/2023    HGB 16.5 08/20/2023    HCT 47.8 08/20/2023     08/20/2023     BMP:   Lab Results   Component Value Date     (L) 08/20/2023    POTASSIUM 3.6 08/20/2023    CHLORIDE 97 (L) 08/20/2023    CO2 21 (L) 08/20/2023    BUN 20.7 08/20/2023    CR 1.20 (H) 08/20/2023     (H) 08/20/2023     COAGS: No results found for: PTT, INR, FIBR  POC: No results found for: BGM, HCG, HCGS  HEPATIC:   Lab Results   Component Value Date    ALBUMIN 4.9 08/20/2023    PROTTOTAL 8.2 08/20/2023    ALT 24 08/20/2023    AST 31 08/20/2023    ALKPHOS 89 08/20/2023    BILITOTAL 0.8 08/20/2023     OTHER:   Lab Results   Component Value Date    LACT 1.5 08/20/2023    MARÍA 11.1 (H) 08/20/2023    MAG 2.0 08/20/2023    LIPASE 33 08/20/2023       Anesthesia Plan    ASA Status:  2       Anesthesia Type: General.     - Airway: LMA   Induction: Propofol.   Maintenance: Balanced.        Consents    Anesthesia Plan(s) and associated risks, benefits, and realistic alternatives discussed. Questions answered and patient/representative(s) expressed understanding.     - Discussed: Risks, Benefits and Alternatives for the PROCEDURE were discussed     - Discussed with:  Patient            Postoperative Care    Pain management: IV analgesics, Oral pain medications.   PONV prophylaxis: Ondansetron (or other 5HT-3), Background Propofol Infusion     Comments:                Favian Roberto MD

## 2023-08-20 NOTE — ED NOTES
"[facility]  ED Nurse Handoff Report    ED Chief complaint: Abdominal Pain  . ED Diagnosis:   Final diagnoses:   None       Allergies: No Known Allergies    Code Status: Full Code    Activity level - Baseline/Home:  independent.  Activity Level - Current:   independent.   Lift room needed: No.   Bariatric: No   Needed: No   Isolation: No.   Infection: Not Applicable.     Respiratory status: Room air    Vital Signs (within 30 minutes):   Vitals:    08/20/23 0800 08/20/23 0801 08/20/23 0802 08/20/23 0803   BP: (!) 152/88      Pulse: 83      Resp:       Temp:       TempSrc:       SpO2: 99% 96% 98% 99%   Weight:       Height:           Cardiac Rhythm:  ,      Pain level:    Patient confused: No.   Patient Falls Risk: nonskid shoes/slippers when out of bed.   Elimination Status: Has voided     Patient Report - Initial Complaint: abd apin.   Focused Assessment:   Kwan Saeed is a 62 year old male with no pertinent past medical history who presents with abdominal pain beginning 6.5 hours ago. Patient states he came up here from [place] with friends for a party two days ago and reports he \"drank too much\" on Friday night. He woke up the following morning with diarrhea and took imodium which helped him feel better. Last night the patient went to another party and states he drank two beers; at midnight he reports lower abdominal cramping that has been persistent since. Endorses nausea and reports he unsuccessfully attempted to force himself to vomit. Denies allergies. He states he takes Lisinopril, Atorvastatin, Allopurinol, Gemtessa, and an antihistamine. Reports he has had kidney stones in the past. Patient still has his appendix.       Abnormal Results:   Labs Ordered and Resulted from Time of ED Arrival to Time of ED Departure   ROUTINE UA WITH MICROSCOPIC REFLEX TO CULTURE - Abnormal       Result Value    Color Urine Light Yellow      Appearance Urine Clear      Glucose Urine Negative   "    Bilirubin Urine Negative      Ketones Urine 10 (*)     Specific Gravity Urine >1.035 (*)     Blood Urine Trace (*)     pH Urine 5.0      Protein Albumin Urine Negative      Urobilinogen Urine Normal      Nitrite Urine Positive (*)     Leukocyte Esterase Urine Moderate (*)     Bacteria Urine Few (*)     Mucus Urine Present (*)     RBC Urine 3 (*)     WBC Urine 15 (*)     Squamous Epithelials Urine <1     COMPREHENSIVE METABOLIC PANEL - Abnormal    Sodium 135 (*)     Potassium 3.6      Chloride 97 (*)     Carbon Dioxide (CO2) 21 (*)     Anion Gap 17 (*)     Urea Nitrogen 20.7      Creatinine 1.20 (*)     Calcium 11.1 (*)     Glucose 122 (*)     Alkaline Phosphatase 89      AST 31      ALT 24      Protein Total 8.2      Albumin 4.9      Bilirubin Total 0.8      GFR Estimate 68     CBC WITH PLATELETS AND DIFFERENTIAL - Abnormal    WBC Count 16.7 (*)     RBC Count 5.28      Hemoglobin 16.5      Hematocrit 47.8      MCV 91      MCH 31.3      MCHC 34.5      RDW 13.2      Platelet Count 327      % Neutrophils 82      % Lymphocytes 8      % Monocytes 9      % Eosinophils 0      % Basophils 0      % Immature Granulocytes 1      NRBCs per 100 WBC 0      Absolute Neutrophils 13.7 (*)     Absolute Lymphocytes 1.4      Absolute Monocytes 1.5 (*)     Absolute Eosinophils 0.0      Absolute Basophils 0.1      Absolute Immature Granulocytes 0.1      Absolute NRBCs 0.0     LIPASE - Normal    Lipase 33     LACTIC ACID WHOLE BLOOD   URINE CULTURE   BLOOD CULTURE   BLOOD CULTURE        CT Abdomen Pelvis w Contrast   Final Result   IMPRESSION:    1.  Horseshoe kidney.   2.  Obstructing left 6 mm ureterovesical junction calculus resulting in moderate left moiety collecting system dilatation, perinephric edema and delayed nephrogram.   3.  Additional multiple bilateral nonobstructing renal calculi.   4.  Left lower lobe 2 mm nodule. Follow-up per Fleischner criteria.      REFERENCE:   Guidelines for Management of Incidental Pulmonary  Nodules Detected on CT Images: From the Fleischner Society 2017.    Guidelines apply to incidental nodules in patients who are 35 years or older.   Guidelines do not apply to lung cancer screening, patients with immunosuppression, or patients with known primary cancer.      SINGLE NODULE   Nodule size <6 mm   Low-risk patients: No follow-up needed.   High-risk patients: Optional follow-up at 12 months.                   Treatments provided see mar  Family Comments: wife here  OBS brochure/video discussed/provided to patient:  Yes  ED Medications:   Medications   cefTRIAXone (ROCEPHIN) 2 g vial to attach to  ml bag for ADULTS or NS 50 ml bag for PEDS (has no administration in time range)   HYDROmorphone (PF) (DILAUDID) injection 0.5 mg (0.5 mg Intravenous $Given 8/20/23 0641)   0.9% sodium chloride BOLUS (1,000 mLs Intravenous $New Bag 8/20/23 0642)   ondansetron (ZOFRAN) injection 4 mg (4 mg Intravenous $Given 8/20/23 0642)   sodium chloride (PF) 0.9% PF flush 100 mL (64 mLs Intravenous $Given 8/20/23 0657)   iopamidol (ISOVUE-370) solution 500 mL (98 mLs Intravenous $Given 8/20/23 0658)   HYDROmorphone (PF) (DILAUDID) injection 0.5 mg (0.5 mg Intravenous $Given 8/20/23 0752)       Drips infusing:  Yes  For the majority of the shift this patient was Green.   Interventions performed were n/a.    Sepsis treatment initiated: No    Cares/treatment/interventions/medications to be completed following ED care:     ED Nurse Name: Rosa Prather RN  8:09 AM

## 2023-08-20 NOTE — ANESTHESIA POSTPROCEDURE EVALUATION
Patient: Kwan Saeed    Procedure: Procedure(s):  CYSTOSCOPY, BLADDER STONE EXTRACTION, LEFT RETROGRADE PYELOGRAM AND URETERAL STENT INSERTION       Anesthesia Type:  General    Note:  Disposition: Outpatient   Postop Pain Control: Uneventful            Sign Out: Well controlled pain   PONV: No   Neuro/Psych: Uneventful            Sign Out: Acceptable/Baseline neuro status   Airway/Respiratory: Uneventful            Sign Out: Acceptable/Baseline resp. status   CV/Hemodynamics: Uneventful            Sign Out: Acceptable CV status; No obvious hypovolemia; No obvious fluid overload   Other NRE:    DID A NON-ROUTINE EVENT OCCUR?            Last vitals:  Vitals Value Taken Time   /72 08/20/23 1150   Temp 96.7  F (35.9  C) 08/20/23 1137   Pulse 80 08/20/23 1158   Resp 12 08/20/23 1158   SpO2 98 % 08/20/23 1158   Vitals shown include unvalidated device data.    Electronically Signed By: Favian Roberto MD  August 20, 2023  11:59 AM

## 2023-08-20 NOTE — PLAN OF CARE
Goal Outcome Evaluation:                      To Do:  End of Shift Summary  For vital signs and complete assessments, please see documentation flowsheets.     Pertinent assessments:  A&O--  arrived from PACU @ 1430----  CAPNO on --- denies pain and nausea -- voiding per urinal  see flowsheets -----tolerating lite diet   Major Shift Event  voided   Treatment Plan:monitor  Bedside Nurse: Eden Roque RN

## 2023-08-20 NOTE — OP NOTE
OPERATIVE REPORT    PREOPERATIVE DIAGNOSIS: Bilateral kidney l stone(s), left distal ureteral stone, acute UTI    POSTOPERATIVE DIAGNOSIS:  Same    PROCEDURES PERFORMED:   1. Cystourethroscopy with left retrograde pyelography  2. Placement of left ureteral stent.  3. Intraoperative interpretation of fluoroscopic imaging.  4.  Removal of bladder stone    STAFF SURGEON: Kenneth Vazquez MD, present for entire case.     ANESTHESIA: General    ESTIMATED BLOOD LOSS: 1 mL.     DRAINS: 6 Fr 26 cm Double J Stent  Specimen: Bladder stone      OPERATIVE INDICATIONS:   Kwan Saeed is a 62 year old male who presented with a left obstructing ureteral stone.  He also has a bilateral kidney stones, horseshoe kidney and an acute UTI.  The patient was counseled on the alternatives, risks, and benefits and elected to proceed with the above stated procedure.    DESCRIPTION OF PROCEDURE:    After informed consent was obtained, the patient was taken to the operating room, and moved to the operating table.  After adequate anesthesia was induced, the patient was repositioned in dorsal lithotomy position and prepped and draped in the usual sterile fashion. A timeout was taken to confirm correct patient, procedure and laterality.     A 22-Pu[place] Rican cystoscope was inserted into a well lubricated urethra. The urethra was unremarkable.  The bladder was free of tumors, stones or diverticuli.  The media was turbid.  Bilateral ureteral orifices were orthotopic.  At the time of cystoscopy, we noticed that the distal ureteral stone had just dropped into the bladder.  There was significant edema and inflammation at the left ureteral orifice.  We started first by retrieving the bladder stone.  Bladder stone was handed over for stone analysis.  Retrograde pyelogram was performed with the help of a 5 [place]ican open-ended ureteral catheter on the left which showed dilated ureter and mild to moderate hydronephrosis.  There were no filling defects.  In view  of the acute UTI with elevated WBC counts, features of distal ureteral obstruction that were persistent as well as the anticipated need for a follow-up procedure for remaining kidney stones we proceeded ahead with stent placement.  A Sensor guidewire was advanced into the left renal pelvis with the aid of a 5-Ivorian open ended ureteral catheter. The wire was replaced and a 6 Fr 26 cm Double J Stent was advanced over the guidewire under fluoroscopic guidance with a good curl in the renal pelvis and bladder.  The stent passed up easily with no appreciable resistance.  The bladder was then drained.    The patient tolerated the procedure well.  There were no complications.         PLAN:   - Admit overnight for monitoring  -Follow-up lives in [place] and has a regular urologist, he will follow-up with his regular urologist for stent removal/ureteroscopy

## 2023-08-20 NOTE — ED TRIAGE NOTES
Pt reports abd pain started midnight. Pt had diarrhea Friday and took 2 imodium Saturday. Pt c/o nausea

## 2023-08-21 VITALS
HEIGHT: 73 IN | OXYGEN SATURATION: 93 % | BODY MASS INDEX: 25.84 KG/M2 | WEIGHT: 195 LBS | TEMPERATURE: 98.9 F | HEART RATE: 94 BPM | RESPIRATION RATE: 20 BRPM | DIASTOLIC BLOOD PRESSURE: 76 MMHG | SYSTOLIC BLOOD PRESSURE: 128 MMHG

## 2023-08-21 LAB
ANION GAP SERPL CALCULATED.3IONS-SCNC: 11 MMOL/L (ref 7–15)
BUN SERPL-MCNC: 14.2 MG/DL (ref 8–23)
CALCIUM SERPL-MCNC: 9.4 MG/DL (ref 8.8–10.2)
CHLORIDE SERPL-SCNC: 106 MMOL/L (ref 98–107)
CREAT SERPL-MCNC: 0.94 MG/DL (ref 0.67–1.17)
DEPRECATED HCO3 PLAS-SCNC: 23 MMOL/L (ref 22–29)
ENTEROCOCCUS FAECALIS: NOT DETECTED
ENTEROCOCCUS FAECIUM: NOT DETECTED
ERYTHROCYTE [DISTWIDTH] IN BLOOD BY AUTOMATED COUNT: 13.4 % (ref 10–15)
GFR SERPL CREATININE-BSD FRML MDRD: >90 ML/MIN/1.73M2
GLUCOSE SERPL-MCNC: 126 MG/DL (ref 70–99)
HCT VFR BLD AUTO: 40.7 % (ref 40–53)
HGB BLD-MCNC: 14.1 G/DL (ref 13.3–17.7)
LACTATE SERPL-SCNC: 1.6 MMOL/L (ref 0.7–2)
LISTERIA SPECIES (DETECTED/NOT DETECTED): NOT DETECTED
MAGNESIUM SERPL-MCNC: 1.9 MG/DL (ref 1.7–2.3)
MCH RBC QN AUTO: 31.8 PG (ref 26.5–33)
MCHC RBC AUTO-ENTMCNC: 34.6 G/DL (ref 31.5–36.5)
MCV RBC AUTO: 92 FL (ref 78–100)
PLATELET # BLD AUTO: 289 10E3/UL (ref 150–450)
POTASSIUM SERPL-SCNC: 4.3 MMOL/L (ref 3.4–5.3)
RBC # BLD AUTO: 4.43 10E6/UL (ref 4.4–5.9)
SODIUM SERPL-SCNC: 140 MMOL/L (ref 136–145)
STAPHYLOCOCCUS AUREUS: DETECTED
STAPHYLOCOCCUS EPIDERMIDIS: NOT DETECTED
STAPHYLOCOCCUS LUGDUNENSIS: NOT DETECTED
STREPTOCOCCUS AGALACTIAE: NOT DETECTED
STREPTOCOCCUS ANGINOSUS GROUP: NOT DETECTED
STREPTOCOCCUS PNEUMONIAE: NOT DETECTED
STREPTOCOCCUS PYOGENES: NOT DETECTED
STREPTOCOCCUS SPECIES: NOT DETECTED
WBC # BLD AUTO: 18.1 10E3/UL (ref 4–11)

## 2023-08-21 PROCEDURE — 258N000003 HC RX IP 258 OP 636: Performed by: INTERNAL MEDICINE

## 2023-08-21 PROCEDURE — 99232 SBSQ HOSP IP/OBS MODERATE 35: CPT | Performed by: PHYSICIAN ASSISTANT

## 2023-08-21 PROCEDURE — 250N000013 HC RX MED GY IP 250 OP 250 PS 637: Performed by: STUDENT IN AN ORGANIZED HEALTH CARE EDUCATION/TRAINING PROGRAM

## 2023-08-21 PROCEDURE — 99238 HOSP IP/OBS DSCHRG MGMT 30/<: CPT | Performed by: INTERNAL MEDICINE

## 2023-08-21 PROCEDURE — 80048 BASIC METABOLIC PNL TOTAL CA: CPT | Performed by: STUDENT IN AN ORGANIZED HEALTH CARE EDUCATION/TRAINING PROGRAM

## 2023-08-21 PROCEDURE — 250N000011 HC RX IP 250 OP 636: Performed by: INTERNAL MEDICINE

## 2023-08-21 PROCEDURE — 85027 COMPLETE CBC AUTOMATED: CPT | Performed by: STUDENT IN AN ORGANIZED HEALTH CARE EDUCATION/TRAINING PROGRAM

## 2023-08-21 PROCEDURE — 83735 ASSAY OF MAGNESIUM: CPT | Performed by: STUDENT IN AN ORGANIZED HEALTH CARE EDUCATION/TRAINING PROGRAM

## 2023-08-21 PROCEDURE — 36415 COLL VENOUS BLD VENIPUNCTURE: CPT | Performed by: INTERNAL MEDICINE

## 2023-08-21 PROCEDURE — 36415 COLL VENOUS BLD VENIPUNCTURE: CPT | Performed by: STUDENT IN AN ORGANIZED HEALTH CARE EDUCATION/TRAINING PROGRAM

## 2023-08-21 PROCEDURE — 258N000003 HC RX IP 258 OP 636: Performed by: STUDENT IN AN ORGANIZED HEALTH CARE EDUCATION/TRAINING PROGRAM

## 2023-08-21 PROCEDURE — 83605 ASSAY OF LACTIC ACID: CPT | Performed by: INTERNAL MEDICINE

## 2023-08-21 RX ADMIN — FAMOTIDINE 20 MG: 20 TABLET, FILM COATED ORAL at 08:21

## 2023-08-21 RX ADMIN — SODIUM CHLORIDE: 9 INJECTION, SOLUTION INTRAVENOUS at 08:23

## 2023-08-21 RX ADMIN — VANCOMYCIN HYDROCHLORIDE 1500 MG: 10 INJECTION, POWDER, LYOPHILIZED, FOR SOLUTION INTRAVENOUS at 08:23

## 2023-08-21 ASSESSMENT — ACTIVITIES OF DAILY LIVING (ADL)
ADLS_ACUITY_SCORE: 35

## 2023-08-21 NOTE — PLAN OF CARE
Goal Outcome Evaluation:       Pt up ind. VSS A&O. IVF infusing. [place] diet, denies nausea. States pain with urination, voids in urinal, urine clear.

## 2023-08-21 NOTE — PHARMACY-VANCOMYCIN DOSING SERVICE
Pharmacy Vancomycin Initial Note  Date of Service 2023  Patient's  1960  62 year old, male    Indication: Bacteremia    Current estimated CrCl = Estimated Creatinine Clearance: 77.8 mL/min (A) (based on SCr of 1.2 mg/dL (H)).    Creatinine for last 3 days  2023:  6:36 AM Creatinine 1.20 mg/dL    Recent Vancomycin Level(s) for last 3 days  No results found for requested labs within last 3 days.      Vancomycin IV Administrations (past 72 hours)        No vancomycin orders with administrations in past 72 hours.                    Nephrotoxins and other renal medications (From now, onward)      Start     Dose/Rate Route Frequency Ordered Stop    23 0730  vancomycin (VANCOCIN) 1,500 mg in 0.9% NaCl 250 mL intermittent infusion         1,500 mg  over 90 Minutes Intravenous EVERY 24 HOURS 23 0701              Contrast Orders - past 72 hours (72h ago, onward)      Start     Dose/Rate Route Frequency Stop    23 1119  ioversol 68% (OPTIRAY 320) 50 mL + sterile water 50 mL injection  Status:  Discontinued           PRN 23 1136    23 0700  iopamidol (ISOVUE-370) solution 500 mL         500 mL Intravenous ONCE 23 0658            [employer] Prediction of Planned Initial Vancomycin Regimen  Loading dose: N/A  Regimen: 1500 mg IV every 24 hours.  Start time: 07:13 on 2023  Exposure target: AUC24 (range)400-600 mg/L.hr   AUC24,ss: 439 mg/L.hr  Probability of AUC24 > 400: 61 %  Ctrough,ss: 12.4 mg/L  Probability of Ctrough,ss > 20: 10 %  Probability of nephrotoxicity (Lodise DEBBIE ): 8 %          Plan:  Start vancomycin  1500 mg IV q24h.   Vancomycin monitoring method: AUC  Vancomycin therapeutic monitoring goal: 400-600 mg*h/L  Pharmacy will check vancomycin levels as appropriate in 1-3 Days.    Serum creatinine levels will be ordered daily for the first week of therapy and at least twice weekly for subsequent weeks.      Yi Emerson [facility]

## 2023-08-21 NOTE — PROGRESS NOTES
Patient hospitalized for UTI. Cleared for discharge to home with self care today per MD. Discharge instructions, medications, and follow-ups reviewed with patient in detail. Discharge medications, including Amoxicillin were filled at [facility] pharmacy . Patient  verbalized understanding of discharge instructions. Belongings were returned to patient at time of discharge. Spouse providing transport home.

## 2023-08-21 NOTE — PROGRESS NOTES
[facility] Progress Note          Assessment and Plan:     Assessment:    POD 1 Cystourethroscopy with left retrograde pyelography, placement of left ureteral stent, intraoperative interpretation of fluoroscopic imaging, removal of bladder stone    Urinary tract infection without hematuria, site unspecified    Ureteral stone    History of uric acid nephrolithiasis    Horseshoe kidney    Bacteremia      Plan:   -Continue with antibiotics.  Per discussion with medicine, it appears patient is going to head home today to [place].  He is being discharged on Augmentin.  Will need cultures follow to ensure this is adequate coverage given positive blood cultures and urine culture that is still pending.  -Continue with indwelling ureteral stent.  Discussed that he will need to be with his local urologist to have removal of this after treatment of infection.  -Possible side effects with an indwelling ureteral stent such as urgency and frequency of urination, dysuria, hematuria, symptoms of urine reflux, and some achiness in the side. Indwelling ureteral stents need to be exchanged every three months or removed by three months.    -Briefly discussed additional preventative recommendations for uric acid stones.  Patient is already on allopurinol.  Briefly discussed potassium citrate.    Veronika Muhammad PA-C   [facility]  [phone]               Interval History:     Doing okay.  Some reflux from the stent.  Denies N/V/F/C/SOB/CP.   Blood cultures 2 of 2 positive for gram-positive cocci in clusters.  Urine culture is currently in process.  Patient is on IV Rocephin and then was going to be switched to possible IV vancomycin.  WBC 10.1 (16.7).  Is currently afebrile.  Brief episodes of tachypnea and tachycardia.  Creatinine 0.94 EGFR greater than 90 (1.20 EGFR of 68).  Previous stones have been uric acid.  Patient is from [place] and is planning on trying to go home today.              Review of  Systems:     The 5 point Review of Systems is negative other than noted in the HPI             Medications:     Current Facility-Administered Medications Ordered in Epic   Medication Dose Route Frequency Last Rate Last Admin    acetaminophen (TYLENOL) tablet 650 mg  650 mg Oral Q6H PRN        allopurinol (ZYLOPRIM) tablet 100 mg  100 mg Oral QPM   100 mg at 08/20/23 2206    atorvastatin (LIPITOR) tablet 10 mg  10 mg Oral QPM   10 mg at 08/20/23 2206    famotidine (PEPCID) tablet 20 mg  20 mg Oral BID   20 mg at 08/21/23 0821    HYDROmorphone (DILAUDID) injection 0.2 mg  0.2 mg Intravenous Q2H PRN        HYDROmorphone (DILAUDID) injection 0.4 mg  0.4 mg Intravenous Q2H PRN        lidocaine (LMX4) cream   Topical Q1H PRN        lidocaine 1 % 0.1-1 mL  0.1-1 mL Other Q1H PRN        naloxone (NARCAN) injection 0.2 mg  0.2 mg Intravenous Q2 Min PRN        Or    naloxone (NARCAN) injection 0.4 mg  0.4 mg Intravenous Q2 Min PRN        Or    naloxone (NARCAN) injection 0.2 mg  0.2 mg Intramuscular Q2 Min PRN        Or    naloxone (NARCAN) injection 0.4 mg  0.4 mg Intramuscular Q2 Min PRN        ondansetron (ZOFRAN ODT) ODT tab 4 mg  4 mg Oral Q6H PRN        Or    ondansetron (ZOFRAN) injection 4 mg  4 mg Intravenous Q6H PRN        oxyCODONE (ROXICODONE) tablet 5 mg  5 mg Oral Q4H PRN        oxyCODONE IR (ROXICODONE) half-tab 2.5 mg  2.5 mg Oral Q4H PRN        senna-docusate (SENOKOT-S/PERICOLACE) 8.6-50 MG per tablet 1 tablet  1 tablet Oral BID PRN        Or    senna-docusate (SENOKOT-S/PERICOLACE) 8.6-50 MG per tablet 2 tablet  2 tablet Oral BID PRN        sodium chloride (PF) 0.9% PF flush 3 mL  3 mL Intracatheter Q8H        sodium chloride (PF) 0.9% PF flush 3 mL  3 mL Intracatheter q1 min prn        sodium chloride 0.9% infusion   Intravenous Continuous 100 mL/hr at 08/21/23 0823 New Bag at 08/21/23 0823    vancomycin (VANCOCIN) 1,500 mg in 0.9% NaCl 250 mL intermittent infusion  1,500 mg Intravenous Q24H   1,500 mg at  08/21/23 0823     Current Outpatient Medications Ordered in Epic   Medication    allopurinol (ZYLOPRIM) 100 MG tablet    amoxicillin-clavulanate (AUGMENTIN) 875-125 MG tablet    atorvastatin (LIPITOR) 10 MG tablet    GEMTESA 75 MG TABS tablet    lisinopril (ZESTRIL) 10 MG tablet    loratadine (CLARITIN) 10 MG tablet                  Physical Exam:   Vitals were reviewed  Patient Vitals for the past 8 hrs:   BP Temp Temp src Pulse Resp SpO2   08/21/23 1126 128/76 98.9  F (37.2  C) Oral 94 20 93 %   08/21/23 0746 118/73 98  F (36.7  C) Oral 75 20 95 %     GEN: NAD, sitting up in bed.  EYES: EOMI  MOUTH: MMM  NECK: Supple  RESP: Unlabored breathing  SKIN: Warm  NEURO: AAO  URO: Urinating on own with ching urine and reflux           Data:   No results found for: NTBNPI, NTBNP  Lab Results   Component Value Date    WBC 18.1 (H) 08/21/2023    WBC 16.7 (H) 08/20/2023    HGB 14.1 08/21/2023    HGB 16.5 08/20/2023    HCT 40.7 08/21/2023    HCT 47.8 08/20/2023    MCV 92 08/21/2023    MCV 91 08/20/2023     08/21/2023     08/20/2023     All cultures:  Recent Labs   Lab 08/20/23  0821 08/20/23  0819 08/20/23  0733   CULTURE Positive on the 1st day of incubation*  Gram positive cocci in clusters* Positive on the 1st day of incubation*  Gram positive cocci in clusters* Culture in progress

## 2023-08-21 NOTE — PROGRESS NOTES
Mr Saeed indicates that he needs to fly home to [place] today.  I advised that he stay for another 24 hours for IV abx and to await final blood cx results as he has 2 blood cx positive for gram positive cocci.  He is very pleasant and understands the risks and chooses to go home.  I discussed with Dr. Turner from his primary clinic in [place] and they graciously agree to see him in clinic tomorrow.  He will be discharged on Augmentin.

## 2023-08-21 NOTE — SIGNIFICANT EVENT
Significant Event Note    Time of event: 5:56 AM August 21, 2023    Description of event:  Informed by nursing about 2/2 positive blood culture bottles for gram-positive cocci in clusters    Plan:  Medical records reviewed, patient is on ceftriaxone may need to switch antibiotics will switch over to vancomycin for now        Sary Aguilar MD

## 2023-08-22 LAB
APPEARANCE STONE: NORMAL
BACTERIA UR CULT: NORMAL
COMPN STONE: NORMAL
SPECIMEN WT: 108 MG

## 2023-08-23 LAB
BACTERIA BLD CULT: ABNORMAL
BACTERIA BLD CULT: ABNORMAL

## 2023-08-23 NOTE — DISCHARGE SUMMARY
"[facility]  Hospitalist Discharge Summary      Date of Admission:  8/20/2023  Date of Discharge:  8/21/2023 11:35 AM  Discharging Provider: Mango Azevedo MD  Discharge Service: Hospitalist Service    Discharge Diagnoses   Infected ureteral stone with Gram-positive cocci bacteremia  History of nephrolithiasis  Pulmonary nodule    Clinically Significant Risk Factors     # Overweight: Estimated body mass index is 25.73 kg/m  as calculated from the following:    Height as of this encounter: 1.854 m (6' 1\").    Weight as of this encounter: 88.5 kg (195 lb).       Follow-ups Needed After Discharge   Follow-up Appointments     Follow-up and recommended labs and tests       Follow up with primary care tomorrow.        Deferred to his primary care provider to assure complete treatment of his Staph aureus bacteremia.  Also consider follow-up imaging for incidental pulmonary nodule seen on CT of the chest.    Unresulted Labs Ordered in the Past 30 Days of this Admission       Date and Time Order Name Status Description    8/20/2023  7:55 AM Blood Culture Hand, Right Preliminary         These results will be followed up by his primary care provider in [place].    Discharge Disposition   Discharged to home  Condition at discharge: Stable    Hospital Course   Kwan Saeed is a 62 year old male visiting from [place] with a history of GERD, Seasonal Allergies, HTN, HLD, BPH s/p Urolift procedure, Nephrolithiasis who presented to the ED with abdominal pain. He was found to have an infected ureteral stone     Obstructing left 6 mm ureterovesical junction calculus   Mild NIGEL - pt presents with abdominal pain, nausea, emesis, recent diarrhea.  Pt is hemodynamically stable, afebrile.  WBC 16.7.  Creatinine 1.2 with baseline 0.9-1.1.  Negative lactic acid.  UA abnormal c/w infection.  CT abd/pelvis revealed an obstructing left 6 mm ureterovesical junction calculus, perinephric edema with additional multiple " bilateral nonobstructing renal calculi and likely reactive scattered left retroperitoneal lymph nodes.       - He was treated with IV hydration and ceftriaxone  - Urology consulted and Mr. Saeed underwent ureteral stent placement  - Blood cultures grew gram positive Cocci x 2 bottles     --Mr Saeed indicated that he needed to fly home to [place].  I advised that he stay for another 24 hours for IV abx and to await final blood cx results as he has 2 blood cx positive for gram positive cocci.  He is very pleasant and understands the risks and chooses to go home.  I discussed with Dr. Turner from his primary clinic in [place] and they graciously agree to see him in clinic the following day.  He was discharged on Augmentin.        -- The gram-positive cocci were eventually identified as methicillin sensitive Staph aureus.     BPH - s/p urolift procedure 4-5 years ago.  On Gemtesa pta; hold.     HTN - hold Lisinopril due to mild NIGEL     HLD - continue Atorvastatin     GERD - uses OTC meds prn.  Start po Famotodine     Pulmonary Nodule - Left lower lobe 2 mm nodule incidentally noted on CT.  Follow up monitoring with PCP     Gout - continue Allopurinol    Consultations This Hospital Stay   UROLOGY IP CONSULT  PHARMACY TO DOSE VANCO    Code Status   Prior    Time Spent on this Encounter          Mango Azevedo MD  [place] [address] [facility]  [address] [place] [address]  Phone: [phone]  Fax: [phone]  ______________________________________________________________________    Physical Exam   Vital Signs:                    Weight: 195 lbs 0 oz         Primary Care Physician   Physician No Ref-Primary    Discharge Orders      Reason for your hospital stay    Infected ureteral stone with sepsis     Follow-up and recommended labs and tests     Follow up with primary care tomorrow.     Activity    Your activity upon discharge: activity as tolerated     Diet    Follow this diet upon  discharge: Orders Placed This Encounter      Regular Diet Adult       Significant Results and Procedures   Most Recent 3 CBC's:  Recent Labs   Lab Test 08/21/23  0710 08/20/23  0636   WBC 18.1* 16.7*   HGB 14.1 16.5   MCV 92 91    327     Most Recent 3 BMP's:  Recent Labs   Lab Test 08/21/23  0710 08/20/23  0636    135*   POTASSIUM 4.3 3.6   CHLORIDE 106 97*   CO2 23 21*   BUN 14.2 20.7   CR 0.94 1.20*   ANIONGAP 11 17*   MARÍA 9.4 11.1*   * 122*     Blood culture x2 positive for methicillin sensitive Staph aureus  ,   Results for orders placed or performed during the hospital encounter of 08/20/23   CT Abdomen Pelvis w Contrast    Narrative    EXAM: CT ABDOMEN AND PELVIS WITH CONTRAST  LOCATION: [facility]  DATE: 08/20/2023    INDICATION: Right lower quadrant abdomen pain.  COMPARISON: None.  TECHNIQUE: CT scan of the abdomen and pelvis was performed following injection of IV contrast. Multiplanar reformats were obtained. Dose reduction techniques were used.  CONTRAST: 98 mL Isovue 370.    FINDINGS:   LOWER CHEST: Left lower lobe 2 mm nodule (2/5).    HEPATOBILIARY: A few scattered subcentimeter hypodensities are too small to characterize, likely benign. Gallbladder is unremarkable.    PANCREAS: Normal.    SPLEEN: Normal.    ADRENAL GLANDS: Normal.    KIDNEYS/BLADDER: Horseshoe kidney. Left 6 mm ureterovesical junction calculus resulting in moderate upstream collecting system dilatation of the left moiety. Additional bilateral nonobstructing calculi (at least three on the right and eight on the left   moiety). Mild left moiety perinephric edema and delayed nephrogram. No solid renal mass.    BOWEL: Unremarkable.    LYMPH NODES: Several scattered left retroperitoneal lymph nodes measuring up to 1.0 cm, likely reactive.    VASCULATURE: Unremarkable.    PELVIC ORGANS: Enlarged prostate with brachytherapy seeds.    MUSCULOSKELETAL: Unremarkable.      Impression    IMPRESSION:    1.  Horseshoe kidney.  2.  Obstructing left 6 mm ureterovesical junction calculus resulting in moderate left moiety collecting system dilatation, perinephric edema and delayed nephrogram.  3.  Additional multiple bilateral nonobstructing renal calculi.  4.  Left lower lobe 2 mm nodule. Follow-up per Fleischner criteria.    REFERENCE:  Guidelines for Management of Incidental Pulmonary Nodules Detected on CT Images: From the Fleischner Society 2017.   Guidelines apply to incidental nodules in patients who are 35 years or older.  Guidelines do not apply to lung cancer screening, patients with immunosuppression, or patients with known primary cancer.    SINGLE NODULE  Nodule size <6 mm  Low-risk patients: No follow-up needed.  High-risk patients: Optional follow-up at 12 months.         XR Surgery MICHAEL L/T 5 Min Fluoro w Stills    Narrative    This exam was marked as non-reportable because it will not be read by a   radiologist or a [place] non-radiologist provider.             Discharge Medications   Discharge Medication List as of 8/21/2023 11:17 AM        START taking these medications    Details   amoxicillin-clavulanate (AUGMENTIN) 875-125 MG tablet Take 1 tablet by mouth 2 times daily, Disp-14 tablet, R-0, E-Prescribe           CONTINUE these medications which have NOT CHANGED    Details   allopurinol (ZYLOPRIM) 100 MG tablet Take 100 mg by mouth every evening, Historical      atorvastatin (LIPITOR) 10 MG tablet Take 10 mg by mouth every evening, Historical      GEMTESA 75 MG TABS tablet Take 1 tablet by mouth every evening, TAB, Historical      lisinopril (ZESTRIL) 10 MG tablet Take 10 mg by mouth every evening, Historical      loratadine (CLARITIN) 10 MG tablet Take 10 mg by mouth every evening, Historical           Allergies   No Known Allergies

## 2023-08-25 LAB
BACTERIA BLD CULT: ABNORMAL
BACTERIA BLD CULT: ABNORMAL

## 2024-03-10 ENCOUNTER — HEALTH MAINTENANCE LETTER (OUTPATIENT)
Age: 64
End: 2024-03-10

## 2025-03-16 ENCOUNTER — HEALTH MAINTENANCE LETTER (OUTPATIENT)
Age: 65
End: 2025-03-16

## (undated) DEVICE — TUBING IRRIG TUR Y TYPE 96" LF 6543-01

## (undated) DEVICE — GLOVE BIOGEL PI MICRO INDICATOR UNDERGLOVE SZ 7.0 48970

## (undated) DEVICE — PACK CYSTO CUSTOM RIDGES

## (undated) DEVICE — SOL WATER IRRIG 1000ML BOTTLE 2F7114

## (undated) DEVICE — CATH URETERAL FLEX TIP TIGERTAIL 06FRX70CM 139006

## (undated) DEVICE — BAG CLEAR TRASH 1.3M 39X33" P4040C

## (undated) DEVICE — PREP SCRUB SOL EXIDINE 4% CHG 4OZ 29002-404

## (undated) DEVICE — RAD RX ISOVUE 300 (50ML) 61% IOPAMIDOL CHARGE PER ML

## (undated) DEVICE — LINEN HALF SHEET 5512

## (undated) DEVICE — GLOVE BIOGEL PI ULTRATOUCH SZ 7.0 41170

## (undated) DEVICE — LINEN FULL SHEET 5511

## (undated) DEVICE — SOL NACL 0.9% IRRIG 3000ML BAG 2B7477

## (undated) RX ORDER — CEFTRIAXONE 2 G/1
INJECTION, POWDER, FOR SOLUTION INTRAMUSCULAR; INTRAVENOUS
Status: DISPENSED
Start: 2023-08-20

## (undated) RX ORDER — DEXAMETHASONE SODIUM PHOSPHATE 4 MG/ML
INJECTION, SOLUTION INTRA-ARTICULAR; INTRALESIONAL; INTRAMUSCULAR; INTRAVENOUS; SOFT TISSUE
Status: DISPENSED
Start: 2023-08-20

## (undated) RX ORDER — GLYCOPYRROLATE 0.2 MG/ML
INJECTION INTRAMUSCULAR; INTRAVENOUS
Status: DISPENSED
Start: 2023-08-20

## (undated) RX ORDER — KETOROLAC TROMETHAMINE 30 MG/ML
INJECTION, SOLUTION INTRAMUSCULAR; INTRAVENOUS
Status: DISPENSED
Start: 2023-08-20

## (undated) RX ORDER — ONDANSETRON 2 MG/ML
INJECTION INTRAMUSCULAR; INTRAVENOUS
Status: DISPENSED
Start: 2023-08-20

## (undated) RX ORDER — EPHEDRINE SULFATE 50 MG/ML
INJECTION, SOLUTION INTRAMUSCULAR; INTRAVENOUS; SUBCUTANEOUS
Status: DISPENSED
Start: 2023-08-20

## (undated) RX ORDER — FENTANYL CITRATE 50 UG/ML
INJECTION, SOLUTION INTRAMUSCULAR; INTRAVENOUS
Status: DISPENSED
Start: 2023-08-20

## (undated) RX ORDER — PROPOFOL 10 MG/ML
INJECTION, EMULSION INTRAVENOUS
Status: DISPENSED
Start: 2023-08-20

## (undated) RX ORDER — LIDOCAINE HYDROCHLORIDE 10 MG/ML
INJECTION, SOLUTION EPIDURAL; INFILTRATION; INTRACAUDAL; PERINEURAL
Status: DISPENSED
Start: 2023-08-20

## (undated) RX ORDER — FENTANYL CITRATE-0.9 % NACL/PF 10 MCG/ML
PLASTIC BAG, INJECTION (ML) INTRAVENOUS
Status: DISPENSED
Start: 2023-08-20